# Patient Record
Sex: MALE | Race: WHITE | NOT HISPANIC OR LATINO | Employment: OTHER | URBAN - METROPOLITAN AREA
[De-identification: names, ages, dates, MRNs, and addresses within clinical notes are randomized per-mention and may not be internally consistent; named-entity substitution may affect disease eponyms.]

---

## 2023-03-28 LAB
ALANINE AMINOTRANSFERASE (SGPT) (U/L) IN SER/PLAS: 18 U/L (ref 10–52)
ALBUMIN (G/DL) IN SER/PLAS: 4.5 G/DL (ref 3.4–5)
ALKALINE PHOSPHATASE (U/L) IN SER/PLAS: 56 U/L (ref 33–136)
ANION GAP IN SER/PLAS: 14 MMOL/L (ref 10–20)
ASPARTATE AMINOTRANSFERASE (SGOT) (U/L) IN SER/PLAS: 16 U/L (ref 9–39)
BILIRUBIN TOTAL (MG/DL) IN SER/PLAS: 0.6 MG/DL (ref 0–1.2)
CALCIUM (MG/DL) IN SER/PLAS: 9.3 MG/DL (ref 8.6–10.3)
CARBON DIOXIDE, TOTAL (MMOL/L) IN SER/PLAS: 23 MMOL/L (ref 21–32)
CHLORIDE (MMOL/L) IN SER/PLAS: 105 MMOL/L (ref 98–107)
CHOLESTEROL (MG/DL) IN SER/PLAS: 272 MG/DL (ref 0–199)
CHOLESTEROL IN HDL (MG/DL) IN SER/PLAS: 53.8 MG/DL
CHOLESTEROL/HDL RATIO: 5.1
CREATININE (MG/DL) IN SER/PLAS: 1.24 MG/DL (ref 0.5–1.3)
ERYTHROCYTE DISTRIBUTION WIDTH (RATIO) BY AUTOMATED COUNT: 13.7 % (ref 11.5–14.5)
ERYTHROCYTE MEAN CORPUSCULAR HEMOGLOBIN CONCENTRATION (G/DL) BY AUTOMATED: 33.2 G/DL (ref 32–36)
ERYTHROCYTE MEAN CORPUSCULAR VOLUME (FL) BY AUTOMATED COUNT: 93 FL (ref 80–100)
ERYTHROCYTES (10*6/UL) IN BLOOD BY AUTOMATED COUNT: 4.91 X10E12/L (ref 4.5–5.9)
ESTIMATED AVERAGE GLUCOSE FOR HBA1C: 160 MG/DL
GFR MALE: 59 ML/MIN/1.73M2
GLUCOSE (MG/DL) IN SER/PLAS: 154 MG/DL (ref 74–99)
HEMATOCRIT (%) IN BLOOD BY AUTOMATED COUNT: 45.5 % (ref 41–52)
HEMOGLOBIN (G/DL) IN BLOOD: 15.1 G/DL (ref 13.5–17.5)
HEMOGLOBIN A1C/HEMOGLOBIN TOTAL IN BLOOD: 7.2 %
LDL: 191 MG/DL (ref 0–99)
LEUKOCYTES (10*3/UL) IN BLOOD BY AUTOMATED COUNT: 5.9 X10E9/L (ref 4.4–11.3)
NRBC (PER 100 WBCS) BY AUTOMATED COUNT: 0 /100 WBC (ref 0–0)
PLATELETS (10*3/UL) IN BLOOD AUTOMATED COUNT: 212 X10E9/L (ref 150–450)
POTASSIUM (MMOL/L) IN SER/PLAS: 4.7 MMOL/L (ref 3.5–5.3)
PROTEIN TOTAL: 7 G/DL (ref 6.4–8.2)
SODIUM (MMOL/L) IN SER/PLAS: 137 MMOL/L (ref 136–145)
THYROTROPIN (MIU/L) IN SER/PLAS BY DETECTION LIMIT <= 0.05 MIU/L: 3.06 MIU/L (ref 0.44–3.98)
TRIGLYCERIDE (MG/DL) IN SER/PLAS: 138 MG/DL (ref 0–149)
UREA NITROGEN (MG/DL) IN SER/PLAS: 26 MG/DL (ref 6–23)
VLDL: 28 MG/DL (ref 0–40)

## 2023-12-07 ENCOUNTER — LAB (OUTPATIENT)
Dept: LAB | Facility: LAB | Age: 78
End: 2023-12-07
Payer: MEDICARE

## 2023-12-07 DIAGNOSIS — R53.83 OTHER FATIGUE: Primary | ICD-10-CM

## 2023-12-07 DIAGNOSIS — N40.1 BENIGN PROSTATIC HYPERPLASIA WITH LOWER URINARY TRACT SYMPTOMS: ICD-10-CM

## 2023-12-07 DIAGNOSIS — I10 ESSENTIAL (PRIMARY) HYPERTENSION: ICD-10-CM

## 2023-12-07 DIAGNOSIS — E11.65 TYPE 2 DIABETES MELLITUS WITH HYPERGLYCEMIA (MULTI): ICD-10-CM

## 2023-12-07 DIAGNOSIS — E78.2 MIXED HYPERLIPIDEMIA: ICD-10-CM

## 2023-12-07 LAB
ALBUMIN SERPL BCP-MCNC: 4.4 G/DL (ref 3.4–5)
ALP SERPL-CCNC: 62 U/L (ref 33–136)
ALT SERPL W P-5'-P-CCNC: 17 U/L (ref 10–52)
ANION GAP SERPL CALC-SCNC: 9 MMOL/L (ref 10–20)
AST SERPL W P-5'-P-CCNC: 16 U/L (ref 9–39)
BASOPHILS # BLD AUTO: 0.06 X10*3/UL (ref 0–0.1)
BASOPHILS NFR BLD AUTO: 1.1 %
BILIRUB SERPL-MCNC: 0.5 MG/DL (ref 0–1.2)
BUN SERPL-MCNC: 20 MG/DL (ref 6–23)
CALCIUM SERPL-MCNC: 9.2 MG/DL (ref 8.6–10.3)
CHLORIDE SERPL-SCNC: 106 MMOL/L (ref 98–107)
CHOLEST SERPL-MCNC: 292 MG/DL (ref 0–199)
CHOLESTEROL/HDL RATIO: 6.5
CO2 SERPL-SCNC: 29 MMOL/L (ref 21–32)
CREAT SERPL-MCNC: 1.14 MG/DL (ref 0.5–1.3)
CREAT UR-MCNC: 73.2 MG/DL (ref 20–370)
EOSINOPHIL # BLD AUTO: 0.19 X10*3/UL (ref 0–0.4)
EOSINOPHIL NFR BLD AUTO: 3.5 %
ERYTHROCYTE [DISTWIDTH] IN BLOOD BY AUTOMATED COUNT: 13.9 % (ref 11.5–14.5)
EST. AVERAGE GLUCOSE BLD GHB EST-MCNC: 160 MG/DL
GFR SERPL CREATININE-BSD FRML MDRD: 65 ML/MIN/1.73M*2
GLUCOSE SERPL-MCNC: 150 MG/DL (ref 74–99)
HBA1C MFR BLD: 7.2 %
HCT VFR BLD AUTO: 43.7 % (ref 41–52)
HDLC SERPL-MCNC: 45.1 MG/DL
HGB BLD-MCNC: 14.3 G/DL (ref 13.5–17.5)
HOLD SPECIMEN: NORMAL
IMM GRANULOCYTES # BLD AUTO: 0.01 X10*3/UL (ref 0–0.5)
IMM GRANULOCYTES NFR BLD AUTO: 0.2 % (ref 0–0.9)
LDLC SERPL CALC-MCNC: 210 MG/DL
LYMPHOCYTES # BLD AUTO: 2.07 X10*3/UL (ref 0.8–3)
LYMPHOCYTES NFR BLD AUTO: 37.8 %
MCH RBC QN AUTO: 30.6 PG (ref 26–34)
MCHC RBC AUTO-ENTMCNC: 32.7 G/DL (ref 32–36)
MCV RBC AUTO: 94 FL (ref 80–100)
MICROALBUMIN UR-MCNC: 51.7 MG/L
MICROALBUMIN/CREAT UR: 70.6 UG/MG CREAT
MONOCYTES # BLD AUTO: 0.53 X10*3/UL (ref 0.05–0.8)
MONOCYTES NFR BLD AUTO: 9.7 %
NEUTROPHILS # BLD AUTO: 2.61 X10*3/UL (ref 1.6–5.5)
NEUTROPHILS NFR BLD AUTO: 47.7 %
NON HDL CHOLESTEROL: 247 MG/DL (ref 0–149)
NRBC BLD-RTO: 0 /100 WBCS (ref 0–0)
PLATELET # BLD AUTO: 213 X10*3/UL (ref 150–450)
POTASSIUM SERPL-SCNC: 4.9 MMOL/L (ref 3.5–5.3)
PROT SERPL-MCNC: 7 G/DL (ref 6.4–8.2)
PSA SERPL-MCNC: 1.44 NG/ML
RBC # BLD AUTO: 4.67 X10*6/UL (ref 4.5–5.9)
RBC #/AREA URNS AUTO: NORMAL /HPF
SODIUM SERPL-SCNC: 139 MMOL/L (ref 136–145)
TRIGL SERPL-MCNC: 184 MG/DL (ref 0–149)
TSH SERPL-ACNC: 3.34 MIU/L (ref 0.44–3.98)
VLDL: 37 MG/DL (ref 0–40)
WBC # BLD AUTO: 5.5 X10*3/UL (ref 4.4–11.3)
WBC #/AREA URNS AUTO: NORMAL /HPF

## 2023-12-07 PROCEDURE — 84153 ASSAY OF PSA TOTAL: CPT

## 2023-12-07 PROCEDURE — 81001 URINALYSIS AUTO W/SCOPE: CPT

## 2023-12-07 PROCEDURE — 80053 COMPREHEN METABOLIC PANEL: CPT

## 2023-12-07 PROCEDURE — 84443 ASSAY THYROID STIM HORMONE: CPT

## 2023-12-07 PROCEDURE — 36415 COLL VENOUS BLD VENIPUNCTURE: CPT

## 2023-12-07 PROCEDURE — 82043 UR ALBUMIN QUANTITATIVE: CPT

## 2023-12-07 PROCEDURE — 83036 HEMOGLOBIN GLYCOSYLATED A1C: CPT

## 2023-12-07 PROCEDURE — 80061 LIPID PANEL: CPT

## 2023-12-07 PROCEDURE — 85025 COMPLETE CBC W/AUTO DIFF WBC: CPT

## 2023-12-07 PROCEDURE — 82570 ASSAY OF URINE CREATININE: CPT

## 2024-05-01 ENCOUNTER — APPOINTMENT (OUTPATIENT)
Dept: RADIOLOGY | Facility: HOSPITAL | Age: 79
DRG: 322 | End: 2024-05-01
Payer: MEDICARE

## 2024-05-01 ENCOUNTER — HOSPITAL ENCOUNTER (INPATIENT)
Facility: HOSPITAL | Age: 79
LOS: 2 days | Discharge: HOME | DRG: 322 | End: 2024-05-03
Attending: INTERNAL MEDICINE | Admitting: STUDENT IN AN ORGANIZED HEALTH CARE EDUCATION/TRAINING PROGRAM
Payer: MEDICARE

## 2024-05-01 ENCOUNTER — APPOINTMENT (OUTPATIENT)
Dept: CARDIOLOGY | Facility: HOSPITAL | Age: 79
DRG: 322 | End: 2024-05-01
Payer: MEDICARE

## 2024-05-01 DIAGNOSIS — I50.22 CHRONIC SYSTOLIC HEART FAILURE (MULTI): ICD-10-CM

## 2024-05-01 DIAGNOSIS — E11.69 TYPE 2 DIABETES MELLITUS WITH OTHER SPECIFIED COMPLICATION, WITHOUT LONG-TERM CURRENT USE OF INSULIN (MULTI): ICD-10-CM

## 2024-05-01 DIAGNOSIS — I21.3 STEMI (ST ELEVATION MYOCARDIAL INFARCTION) (MULTI): Primary | ICD-10-CM

## 2024-05-01 DIAGNOSIS — E78.5 DYSLIPIDEMIA: Chronic | ICD-10-CM

## 2024-05-01 DIAGNOSIS — I21.29 ST ELEVATION (STEMI) MYOCARDIAL INFARCTION INVOLVING OTHER SITES (MULTI): ICD-10-CM

## 2024-05-01 DIAGNOSIS — I10 HYPERTENSION, UNSPECIFIED TYPE: ICD-10-CM

## 2024-05-01 DIAGNOSIS — I25.10 CORONARY ARTERY DISEASE INVOLVING NATIVE HEART, UNSPECIFIED VESSEL OR LESION TYPE, UNSPECIFIED WHETHER ANGINA PRESENT: ICD-10-CM

## 2024-05-01 PROBLEM — I63.9 CVA (CEREBRAL VASCULAR ACCIDENT) (MULTI): Status: ACTIVE | Noted: 2024-05-01

## 2024-05-01 PROBLEM — I63.9 CVA (CEREBRAL VASCULAR ACCIDENT) (MULTI): Chronic | Status: ACTIVE | Noted: 2024-05-01

## 2024-05-01 LAB
ALBUMIN SERPL BCP-MCNC: 4.5 G/DL (ref 3.4–5)
ALP SERPL-CCNC: 67 U/L (ref 33–136)
ALT SERPL W P-5'-P-CCNC: 19 U/L (ref 10–52)
ANION GAP SERPL CALC-SCNC: 15 MMOL/L (ref 10–20)
AST SERPL W P-5'-P-CCNC: 17 U/L (ref 9–39)
BASOPHILS # BLD AUTO: 0.06 X10*3/UL (ref 0–0.1)
BASOPHILS NFR BLD AUTO: 0.7 %
BILIRUB SERPL-MCNC: 0.4 MG/DL (ref 0–1.2)
BUN SERPL-MCNC: 24 MG/DL (ref 6–23)
CALCIUM SERPL-MCNC: 9.6 MG/DL (ref 8.6–10.3)
CARDIAC TROPONIN I PNL SERPL HS: 100 NG/L (ref 0–20)
CHLORIDE SERPL-SCNC: 100 MMOL/L (ref 98–107)
CO2 SERPL-SCNC: 23 MMOL/L (ref 21–32)
CREAT SERPL-MCNC: 1.17 MG/DL (ref 0.5–1.3)
EGFRCR SERPLBLD CKD-EPI 2021: 63 ML/MIN/1.73M*2
EOSINOPHIL # BLD AUTO: 0.34 X10*3/UL (ref 0–0.4)
EOSINOPHIL NFR BLD AUTO: 4.1 %
ERYTHROCYTE [DISTWIDTH] IN BLOOD BY AUTOMATED COUNT: 13.8 % (ref 11.5–14.5)
GLUCOSE SERPL-MCNC: 229 MG/DL (ref 74–99)
HCT VFR BLD AUTO: 44.9 % (ref 41–52)
HGB BLD-MCNC: 15.1 G/DL (ref 13.5–17.5)
IMM GRANULOCYTES # BLD AUTO: 0.01 X10*3/UL (ref 0–0.5)
IMM GRANULOCYTES NFR BLD AUTO: 0.1 % (ref 0–0.9)
INR PPP: 0.8 (ref 0.9–1.1)
LYMPHOCYTES # BLD AUTO: 3.77 X10*3/UL (ref 0.8–3)
LYMPHOCYTES NFR BLD AUTO: 45.6 %
MCH RBC QN AUTO: 30.5 PG (ref 26–34)
MCHC RBC AUTO-ENTMCNC: 33.6 G/DL (ref 32–36)
MCV RBC AUTO: 91 FL (ref 80–100)
MONOCYTES # BLD AUTO: 0.78 X10*3/UL (ref 0.05–0.8)
MONOCYTES NFR BLD AUTO: 9.4 %
NEUTROPHILS # BLD AUTO: 3.31 X10*3/UL (ref 1.6–5.5)
NEUTROPHILS NFR BLD AUTO: 40.1 %
NRBC BLD-RTO: 0 /100 WBCS (ref 0–0)
PLATELET # BLD AUTO: 252 X10*3/UL (ref 150–450)
POTASSIUM SERPL-SCNC: 3.9 MMOL/L (ref 3.5–5.3)
PROT SERPL-MCNC: 7.5 G/DL (ref 6.4–8.2)
PROTHROMBIN TIME: 9.3 SECONDS (ref 9.8–12.8)
RBC # BLD AUTO: 4.95 X10*6/UL (ref 4.5–5.9)
SODIUM SERPL-SCNC: 134 MMOL/L (ref 136–145)
WBC # BLD AUTO: 8.3 X10*3/UL (ref 4.4–11.3)

## 2024-05-01 PROCEDURE — 93458 L HRT ARTERY/VENTRICLE ANGIO: CPT | Performed by: INTERNAL MEDICINE

## 2024-05-01 PROCEDURE — 84075 ASSAY ALKALINE PHOSPHATASE: CPT | Performed by: INTERNAL MEDICINE

## 2024-05-01 PROCEDURE — 2060000001 HC INTERMEDIATE ICU ROOM DAILY

## 2024-05-01 PROCEDURE — 85610 PROTHROMBIN TIME: CPT | Performed by: INTERNAL MEDICINE

## 2024-05-01 PROCEDURE — 96375 TX/PRO/DX INJ NEW DRUG ADDON: CPT

## 2024-05-01 PROCEDURE — 71275 CT ANGIOGRAPHY CHEST: CPT

## 2024-05-01 PROCEDURE — 93010 ELECTROCARDIOGRAM REPORT: CPT | Performed by: EMERGENCY MEDICINE

## 2024-05-01 PROCEDURE — 96374 THER/PROPH/DIAG INJ IV PUSH: CPT

## 2024-05-01 PROCEDURE — 99153 MOD SED SAME PHYS/QHP EA: CPT | Performed by: INTERNAL MEDICINE

## 2024-05-01 PROCEDURE — 2720000007 HC OR 272 NO HCPCS: Performed by: INTERNAL MEDICINE

## 2024-05-01 PROCEDURE — 99285 EMERGENCY DEPT VISIT HI MDM: CPT | Mod: 25

## 2024-05-01 PROCEDURE — C1887 CATHETER, GUIDING: HCPCS | Performed by: INTERNAL MEDICINE

## 2024-05-01 PROCEDURE — C1760 CLOSURE DEV, VASC: HCPCS | Performed by: INTERNAL MEDICINE

## 2024-05-01 PROCEDURE — 96373 THER/PROPH/DIAG INJ IA: CPT | Performed by: INTERNAL MEDICINE

## 2024-05-01 PROCEDURE — C1725 CATH, TRANSLUMIN NON-LASER: HCPCS | Performed by: INTERNAL MEDICINE

## 2024-05-01 PROCEDURE — 74174 CTA ABD&PLVS W/CONTRAST: CPT | Performed by: RADIOLOGY

## 2024-05-01 PROCEDURE — C1769 GUIDE WIRE: HCPCS | Performed by: INTERNAL MEDICINE

## 2024-05-01 PROCEDURE — 96365 THER/PROPH/DIAG IV INF INIT: CPT

## 2024-05-01 PROCEDURE — 92941 PRQ TRLML REVSC TOT OCCL AMI: CPT | Performed by: INTERNAL MEDICINE

## 2024-05-01 PROCEDURE — C1874 STENT, COATED/COV W/DEL SYS: HCPCS | Performed by: INTERNAL MEDICINE

## 2024-05-01 PROCEDURE — 2780000003 HC OR 278 NO HCPCS: Performed by: INTERNAL MEDICINE

## 2024-05-01 PROCEDURE — 36415 COLL VENOUS BLD VENIPUNCTURE: CPT | Performed by: INTERNAL MEDICINE

## 2024-05-01 PROCEDURE — 85025 COMPLETE CBC W/AUTO DIFF WBC: CPT | Performed by: INTERNAL MEDICINE

## 2024-05-01 PROCEDURE — 2550000001 HC RX 255 CONTRASTS: Performed by: EMERGENCY MEDICINE

## 2024-05-01 PROCEDURE — 83036 HEMOGLOBIN GLYCOSYLATED A1C: CPT | Mod: STJLAB

## 2024-05-01 PROCEDURE — C9606 PERC D-E COR REVASC W AMI S: HCPCS | Performed by: INTERNAL MEDICINE

## 2024-05-01 PROCEDURE — B2111ZZ FLUOROSCOPY OF MULTIPLE CORONARY ARTERIES USING LOW OSMOLAR CONTRAST: ICD-10-PCS | Performed by: INTERNAL MEDICINE

## 2024-05-01 PROCEDURE — 93005 ELECTROCARDIOGRAM TRACING: CPT

## 2024-05-01 PROCEDURE — 2500000004 HC RX 250 GENERAL PHARMACY W/ HCPCS (ALT 636 FOR OP/ED): Performed by: INTERNAL MEDICINE

## 2024-05-01 PROCEDURE — 2550000001 HC RX 255 CONTRASTS: Performed by: INTERNAL MEDICINE

## 2024-05-01 PROCEDURE — 99291 CRITICAL CARE FIRST HOUR: CPT | Performed by: EMERGENCY MEDICINE

## 2024-05-01 PROCEDURE — 84484 ASSAY OF TROPONIN QUANT: CPT | Performed by: INTERNAL MEDICINE

## 2024-05-01 PROCEDURE — 4A023N7 MEASUREMENT OF CARDIAC SAMPLING AND PRESSURE, LEFT HEART, PERCUTANEOUS APPROACH: ICD-10-PCS | Performed by: INTERNAL MEDICINE

## 2024-05-01 PROCEDURE — 99152 MOD SED SAME PHYS/QHP 5/>YRS: CPT | Performed by: INTERNAL MEDICINE

## 2024-05-01 PROCEDURE — C1894 INTRO/SHEATH, NON-LASER: HCPCS | Performed by: INTERNAL MEDICINE

## 2024-05-01 PROCEDURE — 71275 CT ANGIOGRAPHY CHEST: CPT | Performed by: RADIOLOGY

## 2024-05-01 PROCEDURE — 027035Z DILATION OF CORONARY ARTERY, ONE ARTERY WITH TWO DRUG-ELUTING INTRALUMINAL DEVICES, PERCUTANEOUS APPROACH: ICD-10-PCS | Performed by: INTERNAL MEDICINE

## 2024-05-01 DEVICE — STENT ONYXNG25012UX ONYX 2.50X12RX
Type: IMPLANTABLE DEVICE | Site: CORONARY | Status: FUNCTIONAL
Brand: ONYX FRONTIER™

## 2024-05-01 DEVICE — STENT ONYXNG25026UX ONYX 2.50X26RX
Type: IMPLANTABLE DEVICE | Site: CORONARY | Status: FUNCTIONAL
Brand: ONYX FRONTIER™

## 2024-05-01 RX ORDER — FENTANYL CITRATE 50 UG/ML
INJECTION, SOLUTION INTRAMUSCULAR; INTRAVENOUS AS NEEDED
Status: DISCONTINUED | OUTPATIENT
Start: 2024-05-01 | End: 2024-05-01 | Stop reason: HOSPADM

## 2024-05-01 RX ORDER — EPTIFIBATIDE 2 MG/ML
INJECTION, SOLUTION INTRAVENOUS CONTINUOUS PRN
Status: COMPLETED | OUTPATIENT
Start: 2024-05-01 | End: 2024-05-01

## 2024-05-01 RX ORDER — ATORVASTATIN CALCIUM 80 MG/1
80 TABLET, FILM COATED ORAL NIGHTLY
Status: DISCONTINUED | OUTPATIENT
Start: 2024-05-01 | End: 2024-05-03 | Stop reason: HOSPADM

## 2024-05-01 RX ORDER — HEPARIN SODIUM 1000 [USP'U]/ML
INJECTION, SOLUTION INTRAVENOUS; SUBCUTANEOUS AS NEEDED
Status: DISCONTINUED | OUTPATIENT
Start: 2024-05-01 | End: 2024-05-01 | Stop reason: HOSPADM

## 2024-05-01 RX ORDER — LOSARTAN POTASSIUM 25 MG/1
25 TABLET ORAL DAILY
Status: DISCONTINUED | OUTPATIENT
Start: 2024-05-02 | End: 2024-05-02

## 2024-05-01 RX ORDER — NITROGLYCERIN 40 MG/100ML
INJECTION INTRAVENOUS AS NEEDED
Status: DISCONTINUED | OUTPATIENT
Start: 2024-05-01 | End: 2024-05-01 | Stop reason: HOSPADM

## 2024-05-01 RX ORDER — SODIUM CHLORIDE 9 MG/ML
75 INJECTION, SOLUTION INTRAVENOUS CONTINUOUS
Status: ACTIVE | OUTPATIENT
Start: 2024-05-01 | End: 2024-05-02

## 2024-05-01 RX ORDER — MIDAZOLAM HYDROCHLORIDE 1 MG/ML
INJECTION, SOLUTION INTRAMUSCULAR; INTRAVENOUS AS NEEDED
Status: DISCONTINUED | OUTPATIENT
Start: 2024-05-01 | End: 2024-05-01 | Stop reason: HOSPADM

## 2024-05-01 RX ORDER — LABETALOL HYDROCHLORIDE 5 MG/ML
20 INJECTION, SOLUTION INTRAVENOUS ONCE
Status: DISCONTINUED | OUTPATIENT
Start: 2024-05-01 | End: 2024-05-03 | Stop reason: HOSPADM

## 2024-05-01 RX ORDER — EPTIFIBATIDE 0.75 MG/ML
2 INJECTION, SOLUTION INTRAVENOUS CONTINUOUS
Status: DISPENSED | OUTPATIENT
Start: 2024-05-01 | End: 2024-05-02

## 2024-05-01 RX ORDER — EPTIFIBATIDE 0.75 MG/ML
INJECTION, SOLUTION INTRAVENOUS CONTINUOUS PRN
Status: COMPLETED | OUTPATIENT
Start: 2024-05-01 | End: 2024-05-01

## 2024-05-01 RX ORDER — ASPIRIN 81 MG/1
81 TABLET ORAL DAILY
Status: DISCONTINUED | OUTPATIENT
Start: 2024-05-02 | End: 2024-05-03 | Stop reason: HOSPADM

## 2024-05-01 RX ORDER — METOPROLOL TARTRATE 25 MG/1
25 TABLET, FILM COATED ORAL 2 TIMES DAILY
Status: DISCONTINUED | OUTPATIENT
Start: 2024-05-01 | End: 2024-05-02

## 2024-05-01 RX ADMIN — IOHEXOL 90 ML: 350 INJECTION, SOLUTION INTRAVENOUS at 21:54

## 2024-05-01 ASSESSMENT — PAIN - FUNCTIONAL ASSESSMENT
PAIN_FUNCTIONAL_ASSESSMENT: 0-10

## 2024-05-01 ASSESSMENT — PAIN SCALES - GENERAL
PAINLEVEL_OUTOF10: 7
PAINLEVEL_OUTOF10: 5 - MODERATE PAIN
PAINLEVEL_OUTOF10: 5 - MODERATE PAIN
PAINLEVEL_OUTOF10: 0 - NO PAIN

## 2024-05-01 ASSESSMENT — PAIN DESCRIPTION - DESCRIPTORS
DESCRIPTORS: HEAVINESS
DESCRIPTORS: HEAVINESS

## 2024-05-01 NOTE — Clinical Note
Angioplasty of the left anterior descending lesion. Inflation 1: Pressure = 10 magaly; Duration = 10 sec.

## 2024-05-01 NOTE — Clinical Note
Vessel: LAD (distal). Stent inserted. Inflation 1: Pressure = 12 magaly; Duration = 10 sec. Inflation 2: Pressure = 12 magaly; Duration = 10 sec.

## 2024-05-01 NOTE — Clinical Note
Angioplasty of the distal left anterior descending lesion. Inflation 1: Pressure = 10 magaly; Duration = 10 sec. Inflation 2: Pressure = 10 magaly; Duration = 10 sec. Inflation 3: Pressure = 10 magaly; Duration = 10 sec.

## 2024-05-02 ENCOUNTER — APPOINTMENT (OUTPATIENT)
Dept: CARDIOLOGY | Facility: HOSPITAL | Age: 79
DRG: 322 | End: 2024-05-02
Payer: MEDICARE

## 2024-05-02 LAB
ALBUMIN SERPL BCP-MCNC: 3.7 G/DL (ref 3.4–5)
ANION GAP SERPL CALC-SCNC: 14 MMOL/L (ref 10–20)
AORTIC VALVE MEAN GRADIENT: 7 MMHG
AORTIC VALVE PEAK VELOCITY: 1.76 M/S
AV PEAK GRADIENT: 12.4 MMHG
AVA (PEAK VEL): 2.35 CM2
AVA (VTI): 2.2 CM2
BUN SERPL-MCNC: 20 MG/DL (ref 6–23)
CALCIUM SERPL-MCNC: 8.5 MG/DL (ref 8.6–10.3)
CARDIAC TROPONIN I PNL SERPL HS: 3161 NG/L (ref 0–20)
CHLORIDE SERPL-SCNC: 105 MMOL/L (ref 98–107)
CO2 SERPL-SCNC: 19 MMOL/L (ref 21–32)
CREAT SERPL-MCNC: 0.84 MG/DL (ref 0.5–1.3)
EGFRCR SERPLBLD CKD-EPI 2021: 88 ML/MIN/1.73M*2
EJECTION FRACTION APICAL 4 CHAMBER: 38.6
ERYTHROCYTE [DISTWIDTH] IN BLOOD BY AUTOMATED COUNT: 13.7 % (ref 11.5–14.5)
EST. AVERAGE GLUCOSE BLD GHB EST-MCNC: 180 MG/DL
GLUCOSE BLD MANUAL STRIP-MCNC: 159 MG/DL (ref 74–99)
GLUCOSE BLD MANUAL STRIP-MCNC: 168 MG/DL (ref 74–99)
GLUCOSE BLD MANUAL STRIP-MCNC: 211 MG/DL (ref 74–99)
GLUCOSE SERPL-MCNC: 182 MG/DL (ref 74–99)
HBA1C MFR BLD: 7.9 %
HCT VFR BLD AUTO: 40.5 % (ref 41–52)
HGB BLD-MCNC: 13.6 G/DL (ref 13.5–17.5)
LEFT VENTRICLE INTERNAL DIMENSION DIASTOLE: 4.9 CM (ref 3.5–6)
LEFT VENTRICULAR OUTFLOW TRACT DIAMETER: 2.2 CM
LV EJECTION FRACTION BIPLANE: 38 %
MAGNESIUM SERPL-MCNC: 2.15 MG/DL (ref 1.6–2.4)
MCH RBC QN AUTO: 30.4 PG (ref 26–34)
MCHC RBC AUTO-ENTMCNC: 33.6 G/DL (ref 32–36)
MCV RBC AUTO: 91 FL (ref 80–100)
NRBC BLD-RTO: 0 /100 WBCS (ref 0–0)
PHOSPHATE SERPL-MCNC: 2.8 MG/DL (ref 2.5–4.9)
PLATELET # BLD AUTO: 208 X10*3/UL (ref 150–450)
POTASSIUM SERPL-SCNC: 3.9 MMOL/L (ref 3.5–5.3)
RBC # BLD AUTO: 4.47 X10*6/UL (ref 4.5–5.9)
RIGHT VENTRICLE FREE WALL PEAK S': 12.7 CM/S
RIGHT VENTRICLE PEAK SYSTOLIC PRESSURE: 20.8 MMHG
SODIUM SERPL-SCNC: 134 MMOL/L (ref 136–145)
TRICUSPID ANNULAR PLANE SYSTOLIC EXCURSION: 1.7 CM
WBC # BLD AUTO: 8 X10*3/UL (ref 4.4–11.3)

## 2024-05-02 PROCEDURE — 85027 COMPLETE CBC AUTOMATED: CPT

## 2024-05-02 PROCEDURE — 36415 COLL VENOUS BLD VENIPUNCTURE: CPT

## 2024-05-02 PROCEDURE — 2500000004 HC RX 250 GENERAL PHARMACY W/ HCPCS (ALT 636 FOR OP/ED): Performed by: INTERNAL MEDICINE

## 2024-05-02 PROCEDURE — 99223 1ST HOSP IP/OBS HIGH 75: CPT | Performed by: INTERNAL MEDICINE

## 2024-05-02 PROCEDURE — 2500000001 HC RX 250 WO HCPCS SELF ADMINISTERED DRUGS (ALT 637 FOR MEDICARE OP)

## 2024-05-02 PROCEDURE — 2500000001 HC RX 250 WO HCPCS SELF ADMINISTERED DRUGS (ALT 637 FOR MEDICARE OP): Performed by: INTERNAL MEDICINE

## 2024-05-02 PROCEDURE — 2060000001 HC INTERMEDIATE ICU ROOM DAILY

## 2024-05-02 PROCEDURE — 2500000006 HC RX 250 W HCPCS SELF ADMINISTERED DRUGS (ALT 637 FOR ALL PAYERS): Mod: MUE | Performed by: INTERNAL MEDICINE

## 2024-05-02 PROCEDURE — 99223 1ST HOSP IP/OBS HIGH 75: CPT

## 2024-05-02 PROCEDURE — 93010 ELECTROCARDIOGRAM REPORT: CPT | Performed by: INTERNAL MEDICINE

## 2024-05-02 PROCEDURE — 83735 ASSAY OF MAGNESIUM: CPT

## 2024-05-02 PROCEDURE — 93306 TTE W/DOPPLER COMPLETE: CPT

## 2024-05-02 PROCEDURE — 84484 ASSAY OF TROPONIN QUANT: CPT | Performed by: INTERNAL MEDICINE

## 2024-05-02 PROCEDURE — 82947 ASSAY GLUCOSE BLOOD QUANT: CPT

## 2024-05-02 PROCEDURE — 80069 RENAL FUNCTION PANEL: CPT

## 2024-05-02 PROCEDURE — 93005 ELECTROCARDIOGRAM TRACING: CPT

## 2024-05-02 PROCEDURE — 2500000002 HC RX 250 W HCPCS SELF ADMINISTERED DRUGS (ALT 637 FOR MEDICARE OP, ALT 636 FOR OP/ED)

## 2024-05-02 PROCEDURE — 36415 COLL VENOUS BLD VENIPUNCTURE: CPT | Performed by: INTERNAL MEDICINE

## 2024-05-02 PROCEDURE — 93306 TTE W/DOPPLER COMPLETE: CPT | Performed by: INTERNAL MEDICINE

## 2024-05-02 RX ORDER — DEXTROSE 50 % IN WATER (D50W) INTRAVENOUS SYRINGE
25
Status: DISCONTINUED | OUTPATIENT
Start: 2024-05-02 | End: 2024-05-03 | Stop reason: HOSPADM

## 2024-05-02 RX ORDER — LOSARTAN POTASSIUM 50 MG/1
50 TABLET ORAL DAILY
Status: DISCONTINUED | OUTPATIENT
Start: 2024-05-03 | End: 2024-05-02

## 2024-05-02 RX ORDER — ONDANSETRON HYDROCHLORIDE 2 MG/ML
4 INJECTION, SOLUTION INTRAVENOUS EVERY 6 HOURS PRN
Status: DISCONTINUED | OUTPATIENT
Start: 2024-05-02 | End: 2024-05-03 | Stop reason: HOSPADM

## 2024-05-02 RX ORDER — ASCORBIC ACID 500 MG
500 TABLET ORAL DAILY
COMMUNITY

## 2024-05-02 RX ORDER — ONDANSETRON 4 MG/1
4 TABLET, FILM COATED ORAL EVERY 8 HOURS PRN
Status: CANCELLED | OUTPATIENT
Start: 2024-05-02

## 2024-05-02 RX ORDER — GLUCOSAMINE HCL 500 MG
1 TABLET ORAL DAILY
COMMUNITY

## 2024-05-02 RX ORDER — POLYETHYLENE GLYCOL 3350 17 G/17G
17 POWDER, FOR SOLUTION ORAL DAILY
Status: DISCONTINUED | OUTPATIENT
Start: 2024-05-02 | End: 2024-05-03 | Stop reason: HOSPADM

## 2024-05-02 RX ORDER — MULTIVIT-MIN/IRON FUM/FOLIC AC 7.5 MG-4
1 TABLET ORAL DAILY
COMMUNITY

## 2024-05-02 RX ORDER — ACETYLCYSTEINE 600 MG
1 CAPSULE ORAL DAILY
COMMUNITY
End: 2024-05-03 | Stop reason: HOSPADM

## 2024-05-02 RX ORDER — BIOTIN 5 MG
1 TABLET ORAL DAILY
COMMUNITY
End: 2024-05-03 | Stop reason: HOSPADM

## 2024-05-02 RX ORDER — DEXTROSE 50 % IN WATER (D50W) INTRAVENOUS SYRINGE
12.5
Status: DISCONTINUED | OUTPATIENT
Start: 2024-05-02 | End: 2024-05-03 | Stop reason: HOSPADM

## 2024-05-02 RX ORDER — LOSARTAN POTASSIUM 25 MG/1
25 TABLET ORAL ONCE
Status: DISCONTINUED | OUTPATIENT
Start: 2024-05-02 | End: 2024-05-02

## 2024-05-02 RX ORDER — ONDANSETRON HYDROCHLORIDE 2 MG/ML
4 INJECTION, SOLUTION INTRAVENOUS EVERY 8 HOURS PRN
Status: CANCELLED | OUTPATIENT
Start: 2024-05-02

## 2024-05-02 RX ORDER — INSULIN LISPRO 100 [IU]/ML
0-10 INJECTION, SOLUTION INTRAVENOUS; SUBCUTANEOUS
Status: DISCONTINUED | OUTPATIENT
Start: 2024-05-02 | End: 2024-05-03 | Stop reason: HOSPADM

## 2024-05-02 RX ORDER — EMPAGLIFLOZIN AND METFORMIN HYDROCHLORIDE 12.5; 1 MG/1; MG/1
1 TABLET ORAL DAILY
COMMUNITY
End: 2024-05-09 | Stop reason: ALTCHOICE

## 2024-05-02 RX ORDER — CARVEDILOL 12.5 MG/1
12.5 TABLET ORAL
Status: DISCONTINUED | OUTPATIENT
Start: 2024-05-02 | End: 2024-05-03 | Stop reason: HOSPADM

## 2024-05-02 RX ORDER — CALCIUM CARBONATE 200(500)MG
500 TABLET,CHEWABLE ORAL ONCE
Status: DISCONTINUED | OUTPATIENT
Start: 2024-05-02 | End: 2024-05-03 | Stop reason: HOSPADM

## 2024-05-02 RX ORDER — L.ACIDOPH/L.BULG/B.BIF/S.THERM 1B-250 MG
1 TABLET ORAL DAILY
COMMUNITY

## 2024-05-02 RX ADMIN — METOPROLOL TARTRATE 25 MG: 25 TABLET, FILM COATED ORAL at 08:29

## 2024-05-02 RX ADMIN — LOSARTAN POTASSIUM 25 MG: 25 TABLET, FILM COATED ORAL at 08:29

## 2024-05-02 RX ADMIN — ATORVASTATIN CALCIUM 80 MG: 80 TABLET, FILM COATED ORAL at 00:04

## 2024-05-02 RX ADMIN — INSULIN LISPRO 2 UNITS: 100 INJECTION, SOLUTION INTRAVENOUS; SUBCUTANEOUS at 14:14

## 2024-05-02 RX ADMIN — TICAGRELOR 90 MG: 90 TABLET ORAL at 21:43

## 2024-05-02 RX ADMIN — INSULIN LISPRO 2 UNITS: 100 INJECTION, SOLUTION INTRAVENOUS; SUBCUTANEOUS at 18:13

## 2024-05-02 RX ADMIN — SODIUM CHLORIDE 75 ML/HR: 9 INJECTION, SOLUTION INTRAVENOUS at 00:04

## 2024-05-02 RX ADMIN — SACUBITRIL AND VALSARTAN 2 TABLET: 24; 26 TABLET, FILM COATED ORAL at 21:42

## 2024-05-02 RX ADMIN — EPTIFIBATIDE 2 MCG/KG/MIN: 0.75 INJECTION INTRAVENOUS at 00:04

## 2024-05-02 RX ADMIN — ATORVASTATIN CALCIUM 80 MG: 80 TABLET, FILM COATED ORAL at 21:43

## 2024-05-02 RX ADMIN — EPTIFIBATIDE 2 MCG/KG/MIN: 0.75 INJECTION INTRAVENOUS at 01:24

## 2024-05-02 RX ADMIN — CARVEDILOL 12.5 MG: 12.5 TABLET, FILM COATED ORAL at 18:13

## 2024-05-02 RX ADMIN — TICAGRELOR 90 MG: 90 TABLET ORAL at 08:29

## 2024-05-02 RX ADMIN — ASPIRIN 81 MG: 81 TABLET, COATED ORAL at 08:30

## 2024-05-02 RX ADMIN — EPTIFIBATIDE 2 MCG/KG/MIN: 0.75 INJECTION INTRAVENOUS at 08:25

## 2024-05-02 RX ADMIN — EPTIFIBATIDE 2 MCG/KG/MIN: 0.75 INJECTION INTRAVENOUS at 15:21

## 2024-05-02 SDOH — ECONOMIC STABILITY: INCOME INSECURITY: HOW HARD IS IT FOR YOU TO PAY FOR THE VERY BASICS LIKE FOOD, HOUSING, MEDICAL CARE, AND HEATING?: NOT HARD AT ALL

## 2024-05-02 SDOH — SOCIAL STABILITY: SOCIAL INSECURITY: HAS ANYONE EVER THREATENED TO HURT YOUR FAMILY OR YOUR PETS?: NO

## 2024-05-02 SDOH — SOCIAL STABILITY: SOCIAL INSECURITY: ARE YOU OR HAVE YOU BEEN THREATENED OR ABUSED PHYSICALLY, EMOTIONALLY, OR SEXUALLY BY ANYONE?: NO

## 2024-05-02 SDOH — SOCIAL STABILITY: SOCIAL INSECURITY: ARE THERE ANY APPARENT SIGNS OF INJURIES/BEHAVIORS THAT COULD BE RELATED TO ABUSE/NEGLECT?: NO

## 2024-05-02 SDOH — ECONOMIC STABILITY: FOOD INSECURITY: WITHIN THE PAST 12 MONTHS, YOU WORRIED THAT YOUR FOOD WOULD RUN OUT BEFORE YOU GOT MONEY TO BUY MORE.: NEVER TRUE

## 2024-05-02 SDOH — ECONOMIC STABILITY: FOOD INSECURITY: WITHIN THE PAST 12 MONTHS, THE FOOD YOU BOUGHT JUST DIDN'T LAST AND YOU DIDN'T HAVE MONEY TO GET MORE.: NEVER TRUE

## 2024-05-02 SDOH — SOCIAL STABILITY: SOCIAL INSECURITY: DO YOU FEEL ANYONE HAS EXPLOITED OR TAKEN ADVANTAGE OF YOU FINANCIALLY OR OF YOUR PERSONAL PROPERTY?: NO

## 2024-05-02 SDOH — ECONOMIC STABILITY: INCOME INSECURITY: IN THE PAST 12 MONTHS, HAS THE ELECTRIC, GAS, OIL, OR WATER COMPANY THREATENED TO SHUT OFF SERVICE IN YOUR HOME?: NO

## 2024-05-02 SDOH — SOCIAL STABILITY: SOCIAL INSECURITY: WERE YOU ABLE TO COMPLETE ALL THE BEHAVIORAL HEALTH SCREENINGS?: YES

## 2024-05-02 SDOH — SOCIAL STABILITY: SOCIAL INSECURITY: ABUSE: ADULT

## 2024-05-02 SDOH — SOCIAL STABILITY: SOCIAL INSECURITY: HAVE YOU HAD ANY THOUGHTS OF HARMING ANYONE ELSE?: NO

## 2024-05-02 SDOH — SOCIAL STABILITY: SOCIAL INSECURITY: HAVE YOU HAD THOUGHTS OF HARMING ANYONE ELSE?: NO

## 2024-05-02 SDOH — SOCIAL STABILITY: SOCIAL INSECURITY: DOES ANYONE TRY TO KEEP YOU FROM HAVING/CONTACTING OTHER FRIENDS OR DOING THINGS OUTSIDE YOUR HOME?: NO

## 2024-05-02 SDOH — SOCIAL STABILITY: SOCIAL INSECURITY: DO YOU FEEL UNSAFE GOING BACK TO THE PLACE WHERE YOU ARE LIVING?: NO

## 2024-05-02 ASSESSMENT — COGNITIVE AND FUNCTIONAL STATUS - GENERAL
PATIENT BASELINE BEDBOUND: NO
CLIMB 3 TO 5 STEPS WITH RAILING: A LITTLE
WALKING IN HOSPITAL ROOM: A LITTLE
MOBILITY SCORE: 22
DAILY ACTIVITIY SCORE: 24

## 2024-05-02 ASSESSMENT — PAIN SCALES - GENERAL
PAINLEVEL_OUTOF10: 0 - NO PAIN
PAINLEVEL_OUTOF10: 5 - MODERATE PAIN
PAINLEVEL_OUTOF10: 0 - NO PAIN
PAINLEVEL_OUTOF10: 0 - NO PAIN

## 2024-05-02 ASSESSMENT — ACTIVITIES OF DAILY LIVING (ADL)
LACK_OF_TRANSPORTATION: NO
WALKS IN HOME: INDEPENDENT
GROOMING: INDEPENDENT
ADEQUATE_TO_COMPLETE_ADL: YES
HEARING - LEFT EAR: FUNCTIONAL
TOILETING: INDEPENDENT
PATIENT'S MEMORY ADEQUATE TO SAFELY COMPLETE DAILY ACTIVITIES?: YES
DRESSING YOURSELF: INDEPENDENT
FEEDING YOURSELF: INDEPENDENT
HEARING - RIGHT EAR: FUNCTIONAL
JUDGMENT_ADEQUATE_SAFELY_COMPLETE_DAILY_ACTIVITIES: YES
BATHING: INDEPENDENT

## 2024-05-02 ASSESSMENT — PAIN - FUNCTIONAL ASSESSMENT
PAIN_FUNCTIONAL_ASSESSMENT: 0-10

## 2024-05-02 ASSESSMENT — PAIN DESCRIPTION - DESCRIPTORS: DESCRIPTORS: SORE

## 2024-05-02 ASSESSMENT — COLUMBIA-SUICIDE SEVERITY RATING SCALE - C-SSRS
2. HAVE YOU ACTUALLY HAD ANY THOUGHTS OF KILLING YOURSELF?: NO
6. HAVE YOU EVER DONE ANYTHING, STARTED TO DO ANYTHING, OR PREPARED TO DO ANYTHING TO END YOUR LIFE?: NO
1. IN THE PAST MONTH, HAVE YOU WISHED YOU WERE DEAD OR WISHED YOU COULD GO TO SLEEP AND NOT WAKE UP?: NO

## 2024-05-02 ASSESSMENT — LIFESTYLE VARIABLES
AUDIT-C TOTAL SCORE: 0
HOW OFTEN DO YOU HAVE 6 OR MORE DRINKS ON ONE OCCASION: NEVER
SKIP TO QUESTIONS 9-10: 1
HOW MANY STANDARD DRINKS CONTAINING ALCOHOL DO YOU HAVE ON A TYPICAL DAY: PATIENT DOES NOT DRINK
AUDIT-C TOTAL SCORE: 0
HOW OFTEN DO YOU HAVE A DRINK CONTAINING ALCOHOL: NEVER

## 2024-05-02 ASSESSMENT — PATIENT HEALTH QUESTIONNAIRE - PHQ9
1. LITTLE INTEREST OR PLEASURE IN DOING THINGS: NOT AT ALL
SUM OF ALL RESPONSES TO PHQ9 QUESTIONS 1 & 2: 0
2. FEELING DOWN, DEPRESSED OR HOPELESS: NOT AT ALL

## 2024-05-02 NOTE — CARE PLAN
Pt admitted to room 2105 following cardiac cath. No complaints of chest pain, does endorse mild heart burn. Pt complained of slight SOB, placed on 2L NC. R radial TR band removed per orders, see associated flow sheet. Site clean/intact. Integrilin gtt running. Safety maintained, will continue to monitor     Problem: Pain - Adult  Goal: Verbalizes/displays adequate comfort level or baseline comfort level  Outcome: Progressing     Problem: Safety - Adult  Goal: Free from fall injury  Outcome: Progressing     Problem: Discharge Planning  Goal: Discharge to home or other facility with appropriate resources  Outcome: Progressing     Problem: Chronic Conditions and Co-morbidities  Goal: Patient's chronic conditions and co-morbidity symptoms are monitored and maintained or improved  Outcome: Progressing     Problem: ACS/CP/NSTEMI/STEMI  Goal: Chest pain managed (free from pain or at acceptable level)  Outcome: Progressing  Goal: Lab values return to normal range  Outcome: Progressing  Goal: Promote self management  Outcome: Progressing  Goal: Serial ECG will return to baseline  Outcome: Progressing  Goal: Verbalize understanding of procedures/devices  Outcome: Progressing     Problem: Cardiac catheterization  Goal: Free from dysrhythmias  Outcome: Progressing  Goal: Free from pain  Outcome: Progressing  Goal: No evidence of post procedure complications  Outcome: Progressing  Goal: Promote self management  Outcome: Progressing  Goal: Verbalize understanding of procedure  Outcome: Progressing  Goal: Care and maintenance of device (specify)  Outcome: Progressing

## 2024-05-02 NOTE — ED PROVIDER NOTES
EMERGENCY DEPARTMENT ENCOUNTER      Pt Name: Phi Hoffman  MRN: [id number]  Birthdate 1945  Date of evaluation: 5/1/2024  Provider: Mich Post DO    CHIEF COMPLAINT     No chief complaint on file.    HISTORY OF PRESENT ILLNESS    Phi Hoffman is a 79 y.o. year old male who presents to the ER for chest pain.  Started 5 days prior to arrival.  Last Po 2030  Chest pain feels like elephant sitting on him + SoB, - fever, - fainting, No abdominal pain  Earlier today had headache and LUE hemiballismus    PMH CAD s/p 8 stents     PAST MEDICAL HISTORY   No past medical history on file.  CURRENT MEDICATIONS       There are no discharge medications for this patient.    SURGICAL HISTORY       Past Surgical History:   Procedure Laterality Date    CARDIAC CATHETERIZATION N/A 5/1/2024    Procedure: Left Heart Cath, No LV;  Surgeon: Maurice Salmon MD;  Location: [facility] Cardiac Cath Lab;  Service: Cardiovascular;  Laterality: N/A;    CARDIAC CATHETERIZATION N/A 5/1/2024    Procedure: PCI;  Surgeon: Maurice Salmon MD;  Location: [facility] Cardiac Cath Lab;  Service: Cardiovascular;  Laterality: N/A;     ALLERGIES     Patient has no known allergies.  FAMILY HISTORY     No family history on file.  SOCIAL HISTORY       Social History     Tobacco Use    Smoking status: Not on file    Smokeless tobacco: Not on file   Substance Use Topics    Alcohol use: Not on file    Drug use: Not on file     PHYSICAL EXAM  (up to 7 for level 4, 8 or more for level 5)     ED Triage Vitals   Temp Pulse Resp BP   -- -- -- --      SpO2 Temp src Heart Rate Source Patient Position   -- -- -- --      BP Location FiO2 (%)     -- --       Physical Exam  Vitals and nursing note reviewed.   Constitutional:       General: He is in acute distress.      Appearance: Normal appearance. He is ill-appearing and diaphoretic.   HENT:      Head: Normocephalic and atraumatic. No raccoon eyes, Nye's sign, contusion or laceration.      Jaw: No trismus or  malocclusion.      Right Ear: External ear normal.      Left Ear: External ear normal.      Mouth/Throat:      Mouth: Mucous membranes are moist.      Pharynx: Oropharynx is clear.   Eyes:      Extraocular Movements: Extraocular movements intact.      Pupils: Pupils are equal, round, and reactive to light.   Neck:      Trachea: No tracheal deviation.   Cardiovascular:      Rate and Rhythm: Normal rate and regular rhythm.      Pulses: Normal pulses.      Comments: Bilateral radials, DPs and PTs symmetric 2+  Pulmonary:      Effort: No respiratory distress.      Breath sounds: No wheezing, rhonchi or rales.   Chest:      Chest wall: No tenderness.   Abdominal:      General: Abdomen is flat.      Palpations: Abdomen is soft. There is no mass.      Tenderness: There is no abdominal tenderness.   Musculoskeletal:         General: No tenderness or signs of injury.      Cervical back: Normal range of motion. No tenderness.      Right lower leg: No edema.      Left lower leg: No edema.   Skin:     Coloration: Skin is not jaundiced or pale.      Findings: No petechiae, rash or wound.   Neurological:      General: No focal deficit present.      Mental Status: He is alert.   Psychiatric:         Speech: Speech normal.         Thought Content: Thought content does not include homicidal or suicidal ideation.        DIAGNOSTIC RESULTS   LABS:  Labs Reviewed   CBC WITH AUTO DIFFERENTIAL - Abnormal       Result Value    WBC 8.3      nRBC 0.0      RBC 4.95      Hemoglobin 15.1      Hematocrit 44.9      MCV 91      MCH 30.5      MCHC 33.6      RDW 13.8      Platelets 252      Neutrophils % 40.1      Immature Granulocytes %, Automated 0.1      Lymphocytes % 45.6      Monocytes % 9.4      Eosinophils % 4.1      Basophils % 0.7      Neutrophils Absolute 3.31      Immature Granulocytes Absolute, Automated 0.01      Lymphocytes Absolute 3.77 (*)     Monocytes Absolute 0.78      Eosinophils Absolute 0.34      Basophils Absolute 0.06      COMPREHENSIVE METABOLIC PANEL - Abnormal    Glucose 229 (*)     Sodium 134 (*)     Potassium 3.9      Chloride 100      Bicarbonate 23      Anion Gap 15      Urea Nitrogen 24 (*)     Creatinine 1.17      eGFR 63      Calcium 9.6      Albumin 4.5      Alkaline Phosphatase 67      Total Protein 7.5      AST 17      Bilirubin, Total 0.4      ALT 19     PROTIME-INR - Abnormal    Protime 9.3 (*)     INR 0.8 (*)    SERIAL TROPONIN-INITIAL - Abnormal    Troponin I, High Sensitivity 100 (*)     Narrative:     Less than 99th percentile of normal range cutoff-  Female and children under 18 years old <14 ng/L; Male <21 ng/L: Negative  Repeat testing should be performed if clinically indicated.     Female and children under 18 years old 14-50 ng/L; Male 21-50 ng/L:  Consistent with possible cardiac damage and possible increased clinical   risk. Serial measurements may help to assess extent of myocardial damage.     >50 ng/L: Consistent with cardiac damage, increased clinical risk and  myocardial infarction. Serial measurements may help assess extent of   myocardial damage.      NOTE: Children less than 1 year old may have higher baseline troponin   levels and results should be interpreted in conjunction with the overall   clinical context.     NOTE: Troponin I testing is performed using a different   testing methodology at [facility] than at other   [facility]. Direct result comparisons should only   be made within the same method.   TROPONIN SERIES- (INITIAL, 1 HR)    Narrative:     The following orders were created for panel order Troponin I Series, High Sensitivity (0, 1 HR).  Procedure                               Abnormality         Status                     ---------                               -----------         ------                     Troponin I, High Sensiti...[[id number]]  Abnormal            Final result               Troponin, High Sensitivi...[[id number]]                      In process                    Please view results for these tests on the individual orders.   SERIAL TROPONIN, 1 HOUR   RENAL FUNCTION PANEL   MAGNESIUM   CBC     All other labs were within normal range or not returned as of this dictation.  Imaging  CT angio chest abdomen pelvis   Final Result   No aortic aneurysm or dissection.        Sliding-type hiatal hernia.        Scattered pulmonary nodules, largest measuring up to 7 mm. Recommend   follow-up CT chest at 6-12 months as per Fleischner criteria. A   pleural-based 8 mm pulmonary nodule abutting the right horizontal   fissure likely represents an intrapulmonary lymph node.        Scattered colonic diverticula. Focal wall thickening of the sigmoid   colon with mild adjacent stranding, which may be seen with   diverticulitis. Correlate with clinical symptoms and consider   nonemergent follow-up with colonoscopy.        Age-indeterminate height loss of the L4 vertebral body. Correlate   with point tenderness.        MACRO:   Critical Finding:  See findings. Notification was initiated on   5/1/2024 at 10:40 pm by  Evan Finkelstein.  (**-YCF-**) Instructions:        Signed by: Evan Finkelstein 5/1/2024 10:40 PM   Dictation workstation:   [id number]      XR chest 1 view    (Results Pending)   Cardiac Catheterization Procedure    (Results Pending)   Transthoracic Echo (TTE) Complete    (Results Pending)      Procedure  Critical Care    Performed by: Reuben Mondragon DO  Authorized by: Reuben Mondragon DO    Critical care provider statement:     Critical care time (minutes):  30    Critical care time was exclusive of:  Separately billable procedures and treating other patients    Critical care was necessary to treat or prevent imminent or life-threatening deterioration of the following conditions:  Cardiac failure    Critical care was time spent personally by me on the following activities:  Development of treatment plan with patient or surrogate, discussions with consultants,  "examination of patient, obtaining history from patient or surrogate, ordering and review of laboratory studies and ordering and review of radiographic studies  Comments:      STEMI with concerns for aortic dissection    EMERGENCY DEPARTMENT COURSE/MDM:   Medical Decision Making    Vitals:    Vitals:    05/01/24 2133 05/01/24 2136 05/01/24 2146 05/01/24 2212   BP: (!) 220/102 (!) 232/110 (!) 220/105    Patient Position: Sitting  Sitting    Pulse: 69 75 65    Resp: 20 19 13    SpO2: 98% 98% 96% 98%   Weight: 95.3 kg (210 lb)      Height: 1.727 m (5' 8\")        Phi Hoffman is a male 79 y.o. who presents to the ER for chest pain. On arrival the patients vital signs were: Afebrile, Reguar HR, Hypertensive, Regular RR, and Oxygenating well on room air. History obtained from: patient and Spouse.  EKG did show inferior wall STEMI, STEMI alert called.  Interventional cardiology, Dr. Salmon notified, Cath Lab activated.  Given transient hemiballismus in setting of chest pain, patient will go to CT for chest attack to rule out dissection prior to Cath Lab.  Patient did receive aspirin prior to arrival from spouse 325mg.  Discussed with interventional cardiology, Dr. Salmon recommended starting heparin and Brilinta if CT shows no dissection.  Labetalol ordered for hypertension.      Notably Dr. Salmon did meet the patient at CT scanner, patient did not return to ED for heparin and Brilinta, was transported straight from CT to Cath Lab.    ED Course as of 05/02/24 0018   Thu May 02, 2024   0015 Protime-INR(!)  No acute coagulopathy [CB]   0017 Troponin I Series, High Sensitivity (0, 1 HR)(!!)  Elevated consistent with ACS [CB]   0017 CBC with Differential(!)  No acute leukocytosis, leukopenia, thrombocytosis, thrombocytopenia, or anemia [CB]   0017 Comprehensive Metabolic Panel(!)  Slightly elevated glucose, mild hyponatremia, no additional acute electrolyte or hepatorenal abnormality [CB]      ED Course User " Index  [CB] Mich Post DO         Diagnoses as of 05/02/24 0018   STEMI (ST elevation myocardial infarction) (Multi)     External Records Reviewed: I reviewed recent and relevant outside records including inpatient notes, outpatient records    Shared decision making for disposition  Patient and/or patient´s representative was counseled regarding labs, imaging, likely diagnosis. All questions were answered. Recommendation was made   for Admission given the need for further escalation of care to inpatient management. Patient agreed and was admitted in stable condition. Admitting team was notified of any pending labs or imaging to ensure continuity of care.     ED Medications administered this visit:    Medications   labetaloL (Normodyne,Trandate) injection 20 mg (has no administration in time range)   sodium chloride 0.9% infusion (75 mL/hr intravenous New Bag 5/2/24 0004)   ticagrelor (Brilinta) tablet 90 mg (180 mg oral Given 5/1/24 2254)   aspirin EC tablet 81 mg (has no administration in time range)   eptifibatide (Integrilin) 0.75 mg/mL infusion (2 mcg/kg/min × 95.3 kg intravenous New Bag 5/2/24 0004)   atorvastatin (Lipitor) tablet 80 mg (80 mg oral Given 5/2/24 0004)   perflutren lipid microspheres (Definity) injection 0.5-10 mL of dilution (has no administration in time range)   metoprolol tartrate (Lopressor) tablet 25 mg (has no administration in time range)   losartan (Cozaar) tablet 25 mg (has no administration in time range)   polyethylene glycol (Glycolax, Miralax) packet 17 g (has no administration in time range)   iohexol (OMNIPaque) 350 mg iodine/mL solution 90 mL (90 mL intravenous Given 5/1/24 2154)   eptifibatide (Integrilin) bolus ( intravenous Restarted 5/1/24 2246)   eptifibatide (Integrilin) 0.75 mg/mL infusion (2 mcg/kg/min × 95.3 kg intravenous New Bag 5/1/24 2237)          Final Impression:   1. STEMI (ST elevation myocardial infarction) (Multi)          Please excuse any misspellings  or unintended errors related to the Dragon speech recognition software used to dictate this note.    I reviewed the case with the attending ED physician. The attending ED physician agrees with the plan.      Mich Post DO  Resident  05/02/24 0018    The patient was seen by the resident/fellow.  I have personally performed a substantive portion of the encounter.  I have seen and examined the patient; agree with the workup, evaluation, MDM, management and diagnosis.  The care plan has been discussed with the resident; I have reviewed the resident’s note and agree with the documented findings.         Reuben Mondragon DO  05/04/24 0708

## 2024-05-02 NOTE — NURSING NOTE
Stable day, BP elevated earlier, improved with morning meds. Integrillin drip to stop at 1800. Ambulated to bathroom, very independent and stable but still a fall risk.

## 2024-05-02 NOTE — H&P
This interview was conducted after cardiac catheterization.    History Of Present Illness  Phi Hoffman is a 79 y.o. male with PMH significant for CAD (s/p multiple PCI's 2002, 2011, 2019 with stents to LCx, LAD, first obtuse marginal branch), CVA (s/p tPA with no residual deficits), factor V Leiden deficiency, HLD, HTN.  Patient presents to [facility] for chief complaint of substernal chest pressure as well as numbness and tingling in the right upper extremity.  Patient stated that he was in his normal state of health this morning and went and worked out at [employer].  At work he started to notice some right upper extremity movement on its own which resolved.  He had his wife come check on him at work, and she continue to check in with him throughout the day making sure he was okay.  Then at approximately 9 PM on 5/1/2024 he stated he was having chest pressure like an elephant was sitting on his chest.  At that time, patient's wife drove him to the emergency department after giving him 324 mg of ASA.  Patient denies lightheadedness, dizziness, palpitations, shortness of breath, cough, sore throat, fevers, chills, N/V/D, dysuria, hematuria, melanotic stools.  At this time, patient endorsing mild heartburn.  Chest pressure that he had prior to presentation is completely resolved.    In the ED:  -Vitals: HR 69, RR 20, /102, SpO2 98% RA  -Labs: CMP: Glucose 229, sodium 134, troponin 100             PT/INR 9.3/0.8             CBC: Unremarkable  -Imaging: CT angio chest abdomen pelvis: No aortic aneurysm or dissection.  Sliding-type hiatal hernia.  Scattered pulmonary nodules largest measuring 7 mm, a pleural-based 8 mm pulmonary nodule abutting the right horizontal fissure likely representing an intrapulmonary lymph node.  Scattered colonic diverticula.  No focal wall thickening of the sigmoid colon with mild adjacent stranding which may be seen in diverticulitis.  Age-indeterminate height loss of the L4  vertebral body.                  EKG: NSR at 67 bpm diffuse ST elevation in inferior leads II, 3, aVF, anterior leads V3-V6,.  -Intervention: After EKG obtained STEMI was alerted, and interventional cardiology was consulted in the emergency department and patient was taken to cardiac catheterization.  Labetalol 20 mg    PMH:   CAD (s/p multiple PCI's 2002, 2011, 2019 with stents to LCx, LAD, first obtuse marginal branch), CVA (s/p tPA with no residual deficits), factor V Leiden deficiency, HLD, HTN  Allergies: NKDA  FH: Noncontributory this presentation  SX H: Multiple PCI's in the past 2002, 2000 [place] 2019  SH: Former smoker 20-pack-year history, denies EtOH use or illicit drug use, lives at home with wife.    CODE STATUS: Full code    Past Medical History  No past medical history on file.    Surgical History  Past Surgical History:   Procedure Laterality Date    CARDIAC CATHETERIZATION N/A 5/1/2024    Procedure: Left Heart Cath, No LV;  Surgeon: Maurice Salmon MD;  Location: [facility] Cardiac Cath Lab;  Service: Cardiovascular;  Laterality: N/A;    CARDIAC CATHETERIZATION N/A 5/1/2024    Procedure: PCI;  Surgeon: Maurice Salmon MD;  Location: [facility] Cardiac Cath Lab;  Service: Cardiovascular;  Laterality: N/A;        Social History  He has no history on file for tobacco use, alcohol use, and drug use.    Family History  No family history on file.     Allergies  Patient has no known allergies.    Review of Systems  Patient denies all other symptomatology pertaining to 12 point ROS with exception of those listed above HPI  Physical Exam  General: Not in acute distress, A&O x 3, alert, cooperative, well-developed  HEENT: Normocephalic, atraumatic, EOMI, moist mucous membranes  Neck: Neck supple, trachea midline, no evidence of trauma  Cardiovascular: RRR, S1 and S2 appreciated, no murmurs rubs gallops appreciated, distal pulses 2+ bilaterally (radial and dorsalis pedis)  Respiratory: Vesicular breath sounds appreciated  "bilaterally, no adventitious sounds appreciated, no increased work of breathing on room air  GI: Abdomen soft, nondistended, nontender to palpation, bowel sounds present  Extremities: No edema appreciated in lower extremities bilaterally, no cyanosis, pressure band noted on right upper extremity.  Neuro: A&O X3, no focal deficits, strength and sensation intact bilaterally  Skin: Warm and dry, without lesions or rashes    Last Recorded Vitals  Blood pressure (!) 220/105, pulse 65, resp. rate 13, height 1.727 m (5' 8\"), weight 95.3 kg (210 lb), SpO2 98%.    Relevant Results  Scheduled medications  aspirin, 81 mg, oral, Daily  atorvastatin, 80 mg, oral, Nightly  labetaloL, 20 mg, intravenous, Once  losartan, 25 mg, oral, Daily  metoprolol tartrate, 25 mg, oral, BID  perflutren lipid microspheres, 0.5-10 mL of dilution, intravenous, Once in imaging  polyethylene glycol, 17 g, oral, Daily  ticagrelor, 90 mg, oral, BID      Continuous medications  eptifibatide, 2 mcg/kg/min  sodium chloride 0.9%, 75 mL/hr      PRN medications    CT angio chest abdomen pelvis    Result Date: 5/1/2024  Interpreted By:  Finkelstein, Evan, STUDY: CT ANGIO CHEST ABDOMEN PELVIS;  5/1/2024 10:11 pm   INDICATION: Signs/Symptoms:STEMI, R arm pain.   COMPARISON: Chest radiograph 05/03/2019   ACCESSION NUMBER(S): [id number]   ORDERING CLINICIAN: BO ROSALES   TECHNIQUE: Contiguous axial CT images of the chest , abdomen and pelvis were obtained without IV contrast.  Subsequent axial CTA images of the chest , abdomen and pelvis were obtained after administration of 90 mL Omnipaque 350 IV contrast using CT angiographic technique. Coronal and sagittal images are reconstructed.  3D reconstructions were obtained at a separate workstation.   FINDINGS: VASCULAR: AORTA: No intramural hematoma or displaced intimal calcifications on noncontrast imaging. No aortic aneurysm or dissection. Moderate atherosclerotic disease. Pulmonary artery:  Normal caliber. " No pulmonary embolus to the segmental level.   CHEST:   HEART: Normal size. No pericardial effusion. MEDIASTINUM AND ARYAN: No pathologically enlarged thoracic lymph nodes. Hiatal hernia. LUNG, PLEURA, LARGE AIRWAYS: 8 mm pulmonary nodule abutting the right horizontal fissure best seen image 143 of series 202. 3 mm pulmonary nodule in the right middle lobe image 154. Mild right basilar atelectasis. 7 mm pulmonary nodule in the left lower lobe image 200. CHEST WALL AND LOWER NECK: Mild bilateral gynecomastia.   ABDOMEN/PELVIS:   LIVER: Normal attenuation and contour. BILE DUCTS: Normal caliber. GALLBLADDER: No calcified gallstones. No wall thickening. SPLEEN: Unremarkable. PANCREAS: Unremarkable. ADRENALS:  Unremarkable. KIDNEYS, URETERS AND BLADDER: Symmetric renal enhancement. No hydronephrosis or perinephric fluid collection.  Bladder is within normal limits   REPRODUCTIVE ORGANS: No pelvic masses.   ABDOMINAL WALL: Fat containing inguinal hernias.   BOWEL: Normal caliber. Scattered colonic diverticula. Focal wall thickening of the sigmoid colon with mild adjacent stranding. Normal appendix. PERITONEUM: No ascites or free air, no fluid collection. RETROPERITONEUM: Within normal limits.   BONES: Age-indeterminate height loss of the L4 vertebral body. No bony retropulsion.       No aortic aneurysm or dissection.   Sliding-type hiatal hernia.   Scattered pulmonary nodules, largest measuring up to 7 mm. Recommend follow-up CT chest at 6-12 months as per Fleischner criteria. A pleural-based 8 mm pulmonary nodule abutting the right horizontal fissure likely represents an intrapulmonary lymph node.   Scattered colonic diverticula. Focal wall thickening of the sigmoid colon with mild adjacent stranding, which may be seen with diverticulitis. Correlate with clinical symptoms and consider nonemergent follow-up with colonoscopy.   Age-indeterminate height loss of the L4 vertebral body. Correlate with point tenderness.    MACRO: Critical Finding:  See findings. Notification was initiated on 5/1/2024 at 10:40 pm by  Evan Finkelstein.  (**-YCF-**) Instructions:   Signed by: Evan Finkelstein 5/1/2024 10:40 PM Dictation workstation:   [id number]   Results for orders placed or performed during the hospital encounter of 05/01/24 (from the past 24 hour(s))   CBC with Differential   Result Value Ref Range    WBC 8.3 4.4 - 11.3 x10*3/uL    nRBC 0.0 0.0 - 0.0 /100 WBCs    RBC 4.95 4.50 - 5.90 x10*6/uL    Hemoglobin 15.1 13.5 - 17.5 g/dL    Hematocrit 44.9 41.0 - 52.0 %    MCV 91 80 - 100 fL    MCH 30.5 26.0 - 34.0 pg    MCHC 33.6 32.0 - 36.0 g/dL    RDW 13.8 11.5 - 14.5 %    Platelets 252 150 - 450 x10*3/uL    Neutrophils % 40.1 40.0 - 80.0 %    Immature Granulocytes %, Automated 0.1 0.0 - 0.9 %    Lymphocytes % 45.6 13.0 - 44.0 %    Monocytes % 9.4 2.0 - 10.0 %    Eosinophils % 4.1 0.0 - 6.0 %    Basophils % 0.7 0.0 - 2.0 %    Neutrophils Absolute 3.31 1.60 - 5.50 x10*3/uL    Immature Granulocytes Absolute, Automated 0.01 0.00 - 0.50 x10*3/uL    Lymphocytes Absolute 3.77 (H) 0.80 - 3.00 x10*3/uL    Monocytes Absolute 0.78 0.05 - 0.80 x10*3/uL    Eosinophils Absolute 0.34 0.00 - 0.40 x10*3/uL    Basophils Absolute 0.06 0.00 - 0.10 x10*3/uL   Comprehensive Metabolic Panel   Result Value Ref Range    Glucose 229 (H) 74 - 99 mg/dL    Sodium 134 (L) 136 - 145 mmol/L    Potassium 3.9 3.5 - 5.3 mmol/L    Chloride 100 98 - 107 mmol/L    Bicarbonate 23 21 - 32 mmol/L    Anion Gap 15 10 - 20 mmol/L    Urea Nitrogen 24 (H) 6 - 23 mg/dL    Creatinine 1.17 0.50 - 1.30 mg/dL    eGFR 63 >60 mL/min/1.73m*2    Calcium 9.6 8.6 - 10.3 mg/dL    Albumin 4.5 3.4 - 5.0 g/dL    Alkaline Phosphatase 67 33 - 136 U/L    Total Protein 7.5 6.4 - 8.2 g/dL    AST 17 9 - 39 U/L    Bilirubin, Total 0.4 0.0 - 1.2 mg/dL    ALT 19 10 - 52 U/L   Protime-INR   Result Value Ref Range    Protime 9.3 (L) 9.8 - 12.8 seconds    INR 0.8 (L) 0.9 - 1.1   Troponin I, High Sensitivity,  Initial   Result Value Ref Range    Troponin I, High Sensitivity 100 (HH) 0 - 20 ng/L          Assessment/Plan   Principal Problem:    STEMI (ST elevation myocardial infarction) (Multi)  Active Problems:    Benign essential HTN    Dyslipidemia    CVA (cerebral vascular accident) (Multi)  Patient is an 79-year-old male with PMH significant for CAD (s/p multiple PCI's 2002, 2011, 2019 with stents to LCx, LAD, first obtuse marginal branch), CVA (s/p tPA with no residual deficits), factor V Leiden deficiency, HLD, HTN.  Patient presents to [facility] for chief complaint of substernal chest pressure as well as numbness and tingling in the right upper extremity.  In the emergency department patient was found to have STEMI with elevated troponin and EKG reflecting that with ST elevations in inferior and anterior leads.  STEMI alert was activated and interventional cardiology was reached out to.  Patient was taken to cardiac catheterization lab.  Patient underwent cardiac catheterization with stent placement.  Patient was admitted to internal medicine service for further management post STEMI and cardiac catheterization.    STEMI (s/p PCI on 5/1/2024) 2/2 CAD:  HTN  HLD  Patient presents to [facility] for chief complaint of chest pressure and right arm numbness.  -S/p  prior to presentation  -EKG illustrating ST elevations in inferior and anterior leads II, 3, aVF, V3-V6  -CT angio chest abdomen pelvis to rule out dissection in the setting of right upper extremity hemiballismus earlier today which resolved.  -Initial troponin 100  -STEMI alert was activated, interventional cardiology Dr. Salmon was consulted  -S/p cardiac catheterization  --> Acutely occluded LAD PCI with 2 EARLINE placement, chronically occluded circumflex, distal embolization of thrombus of LAD  ---> Double bolus of Integrilin and started on Integrilin drip for 18 hours, 5000 units heparin given  --> Patient loaded with 180 mg Brilinta, will continue with ASA 81  "mg indefinitely, Brilinta 90 mg p.o. twice daily for 12 months  -->Begin atorvastatin 80 mg daily, losartan 25 mg daily, metoprolol tartrate 25 mg twice daily  -Telemetry ordered  - A1c ordered    4.  Heartburn:  -Patient has no history of heartburn  -Likely secondary to uncoated aspirin that patient took prior to presentation.  -Will be given calcium carbonate 500 mg once.    Hx CVA  Factor V Leiden deficiency  -No medications taken for these conditions.    IVF: None at this time  Diet: Cardiac  DVT prophylaxis: None at this time as patient is on Integrilin drip  Dispo: Anticipate 1 to 2 days hospital stay  Consults: Interventional cardiology, Viky    CODE STATUS: Full code    Patient to be staffed with attending physician.    Luiz Jensen MD  Internal medicine PGY 1    =====================  Day team addendum  An 79-year-old with PMH of CAD, CVA who presented with STEMI.  Overnight, he underwent cardiac cath with findings consistent with acute occluded LAD and 2 drug-eluting stent were placed.  He was started on Integrilin drip (to be continued 18 hours after cath, end time about 6 PM) and loaded with Brilinta.  The patient seen examined this morning at bedside. The patient reported having shortness of breath and mild chest discomfort that is \"not unbearable\" in addition to nausea with no vomiting.  He does not have appetite to order the breakfast.  He denied palpitations, headache, lightheadedness/vertigo/lightheadedness.  Patient's wife was at bedside updated about the plan and their questions were answered and concerns were addressed.  On physical examination, he was sitting on the chair comfortably room air not in respiratory wheezing.  In pain.  Lung auscultation revealed good air entry bilaterally without any crackles or wheezing.  Card examination did show normal S1-S2 no with no added sounds or murmurs.  No lower limb edema.  -A1c 7.9.  Mild sliding scale and  glucose check ACHS.  -Echo " pending  -Zofran as needed for nausea.  -He needs to be on aspirin indefinitely, Brilinta for 12 months, high intensity statin (atorvastatin 80 mg).  Follow-up with cardiology outpatient.    -Rest as above.    Global Plan of Care  Fluid: PRN  Electrolytes: PRN  Nutrition: Cardiac  DVT Prophylaxis: None as he is on Integrilin drip.  Disposition: Anticipate 1-2 midnights pending for finishing Integrilin drip echo results.  Code Status: Full Code    Case discussed with the attending..  Signature: Carlene Kenney MD  Date: May 2, 2024  Please excuse any misspellings or unintended errors related to the Dragon speech recognition software used to dictate this note.

## 2024-05-02 NOTE — POST-PROCEDURE NOTE
Physician Transition of Care Summary  Invasive Cardiovascular Lab    Procedure Date: 5/1/2024  Attending:    * Maurice Salmon - Primary  Resident/Fellow/Other Assistant: Surgeons and Role:  * No surgeons found with a matching role *    Indications:   Pre-op Diagnosis     * STEMI (ST elevation myocardial infarction) (Multi) [I21.3]    Post-procedure diagnosis:   Post-op Diagnosis     * STEMI (ST elevation myocardial infarction) (Multi) [I21.3]    Procedure(s):   Left Heart Cath, No LV  36636 - [place] L HRT CATH W/NJX L VENTRICULOGRAPHY IMG S&I    PCI  46597 - [place] PRQ TRLUML CORONARY ANGIOPLASTY ONE ART/BRANCH        Procedure Findings:   Left dominant system.  Acutely occluded LAD.  Chronically occluded circumflex.  Left to left collaterals of the circumflex arising from a septal  of the LAD.    PCI of the mid LAD with overlapping [place] 2.5 x 12 mm and 2.5 x 26 mm MOE.    There was distal embolization of thrombus to the distal LAD.  Patient was started on Integrilin with a double bolus and continue drip for 18 hours.    Description of the Procedure:   Right radial, 6 Ethiopian sheath.  5 Ethiopian JR4 and XB 3.5 guide.  5000 units of heparin given.  Run-through wire used to cannulate the LAD.  Angioplasty with a 2.5 x 12 mm balloon followed by stenting with [place] 2.5 x 26 mm stent.  There is a proximal lesion to this stent and an overlapping 2.5 x 12 mm MOE was deployed.  Postdilated with a 2.5 mm noncompliant balloon.    The circumflex was probed with the run-through wire however would not easily cross.  Given the robust collaterals from the septal branch to the OM system this would appear to be a chronic total occlusion and not the culprit for the patient's presentation.    Complications:   None    Stents/Implants:   Implants       Stent    Stent, [place], 2.50 X 26rx - [id number] - Implanted        Inventory item: STENT, COREEN FRONTIER MOE, 2.50 X 26RX Model/Cat number: [id number]     : [employer] Lot number: [id number]    Device identifier: [id number]        As of 5/1/2024       Status: Implanted                      Stent, [place], 2.50 X 12rx - [id number] - Implanted        Inventory item: STENT, COREEN FRONTIER MOE, 2.50 X 12RX Model/Cat number: [id number]    : [employer] Lot number: [id number]    Device identifier: [id number]        As of 5/1/2024       Status: Implanted                              Anticoagulation/Antiplatelet Plan:   5000 units of heparin given.  Patient loaded with Brilinta 180 mg.  Had taken 325 mg of aspirin prior to arrival.    The patient was given a double bolus of Integrilin and started on a drip which we will continue for 18 hours.    Patient will be instructed to continue with aspirin 81 mg indefinitely, Brilinta 90 mg p.o. twice daily for 12 months.    Estimated Blood Loss:   * No values recorded between 5/1/2024 10:06 PM and 5/1/2024 10:36 PM *    Anesthesia: Moderate Sedation Anesthesia Staff: No anesthesia staff entered.    Any Specimen(s) Removed:   No specimens collected during this procedure.    Disposition:   Will be transferred to the floor      Electronically signed by: Maurice Salmon MD, 5/1/2024 10:36 PM

## 2024-05-02 NOTE — PROGRESS NOTES
05/02/24 1257   Discharge Planning   Living Arrangements Spouse/significant other   Support Systems Spouse/significant other   Assistance Needed none   Type of Residence Private residence   Number of Stairs to Enter Residence 3   Number of Stairs Within Residence 12  (Bilevel home)   Do you have animals or pets at home? Yes   Type of Animals or Pets 1 dog, 1 turtle, 1 bearded dragon   Who is requesting discharge planning? Provider   Home or Post Acute Services None   Patient expects to be discharged to: home   Does the patient need discharge transport arranged? No     Met with patient and spouse at bedside. Verified address and PCP. Uses [facility] in [place] for medications and has no issues in obtaining them, takes medications as directed. No ambulation devices used, still drives, owns own business, still works and is very independent with all his own ADL's. Patient states has no needs at this time and will return home on discharge. TCC team will follow patient for all discharge needs.

## 2024-05-02 NOTE — CONSULTS
Consults  History Of Present Illness:    Phi Hoffman is a 79 y.o. male presenting with chest pain.    Patient states that he has been having intermittent chest pain for the prior 5 days.  He went to workout and developed some right upper extremity twitching.  It resolved.  He went home but later that night developed severe crushing chest pain without radiation.  He came to the hospital for further evaluation.  He did take 4 baby aspirin prior to coming to the hospital.    He was hypertensive when he arrived and ECG demonstrated concerning ST changes in the inferior and anterior leads concerning for STEMI.  Because of his elevated blood pressure, 20 mmHg difference in blood pressures of the upper extremities, and his right arm pain a CT angio of the chest was performed to rule out dissection.  There was no evidence of aortic dissection and the patient was brought to the Cath Lab.    He was found to have a chronically occluded circumflex and an acutely occluded LAD.  He underwent PCI of the LAD.  There was distal embolization to the apical LAD and he was started on Integrilin at the end of the procedure.    Since that time his chest pain is improved.    The patient has a history of CAD and multiple PCI's in the past.  He has been on several statins however had significant muscle aches and stiffness on statins.  He previously had been driving quite a bit between [place] and [place] and found that he was extremely stiff with getting out of the car during breaks while he was on a statin.  He subsequently stopped his statins.    He was last seen in the office about 3 years ago.  He has been lost to follow-up.     Last Recorded Vitals:  Vitals:    05/02/24 0400 05/02/24 0800 05/02/24 0920 05/02/24 1200   BP: 173/80 (!) 190/90 175/86 160/73   BP Location: Left arm Left arm     Patient Position: Lying Sitting     Pulse: 52 56 54 53   Resp: 14 20 18 26   Temp: 36.4 °C (97.5 °F) 35.7 °C (96.3 °F)  36.3 °C (97.3 °F)    TempSrc: Temporal Temporal     SpO2: 99% 99%  100%   Weight:       Height:           Last Labs:  CBC - 5/2/2024:  4:58 AM  8.0 13.6 208    40.5      CMP - 5/2/2024:  4:58 AM  8.5 7.5 17 --- 0.4   2.8 3.7 19 67      PTT - No results in last year.  0.8   9.3 _     Troponin I, High Sensitivity   Date/Time Value Ref Range Status   05/02/2024 12:00 AM 3,161 (HH) 0 - 20 ng/L Final     Comment:     Previous result verified on 5/1/2024 2207 on specimen/case [id number] called with component [facility] for procedure Troponin I, High Sensitivity, Initial with value 100 ng/L.   05/01/2024 09:35  (HH) 0 - 20 ng/L Final     BNP   Date/Time Value Ref Range Status   04/18/2019 11:37  (H) 0 - 99 pg/mL Final     Comment:     .  <100 pg/mL - Heart failure unlikely  100-299 pg/mL - Intermediate probability of acute heart  .               failure exacerbation. Correlate with clinical  .               context and patient history.    >=300 pg/mL - Heart Failure likely. Correlate with clinical  .               context and patient history.  BNP testing is performed using different testing   methodology at [facility] than at other   [facility] hospitals. Direct result comparisons should   only be made within the same method.       Hemoglobin A1C   Date/Time Value Ref Range Status   05/01/2024 09:35 PM 7.9 (H) see below % Final   12/07/2023 07:48 AM 7.2 (H) see below % Final     LDL Calculated   Date/Time Value Ref Range Status   12/07/2023 07:48  (H) <=99 mg/dL Final     Comment:                                 Near   Borderline      AGE      Desirable  Optimal    High     High     Very High     0-19 Y     0 - 109     ---    110-129   >/= 130     ----    20-24 Y     0 - 119     ---    120-159   >/= 160     ----      >24 Y     0 -  99   100-129  130-159   160-189     >/=190       VLDL   Date/Time Value Ref Range Status   12/07/2023 07:48 AM 37 0 - 40 mg/dL Final   03/28/2023 07:25 AM 28 0 - 40 mg/dL Final  "  06/01/2022 08:40 AM 24 0 - 40 mg/dL Final   12/09/2019 07:23 AM 23 0 - 40 mg/dL Final      Last I/O:  I/O last 3 completed shifts:  In: 486.7 (4.9 mL/kg) [I.V.:486.7 (4.9 mL/kg)]  Out: 355 (3.6 mL/kg) [Urine:350 (0.1 mL/kg/hr); Blood:5]  Weight: 99.5 kg     Past Cardiology Tests (Last 3 Years):  EKG:  Electrocardiogram 12 Lead 05/02/2024 (Preliminary)      ECG 12 lead 05/01/2024 (Preliminary)    Echo:  Transthoracic Echo (TTE) Complete 05/02/2024    Ejection Fractions:  No results found for: \"EF\"  Cath:  Cardiac Catheterization Procedure 05/01/2024    Stress Test:  Nuclear Stress Test 09/09/2021    Cardiac Imaging:  No results found for this or any previous visit from the past 1095 days.      Past Medical History:  He has no past medical history on file.    Past Surgical History:  He has a past surgical history that includes Cardiac catheterization (N/A, 5/1/2024) and Cardiac catheterization (N/A, 5/1/2024).      Social History:  He reports that he has never smoked. He has never used smokeless tobacco. No history on file for alcohol use and drug use.    Family History:  No family history on file.     Allergies:  Patient has no known allergies.    Inpatient Medications:  Scheduled medications   Medication Dose Route Frequency    aspirin  81 mg oral Daily    atorvastatin  80 mg oral Nightly    calcium carbonate  500 mg oral Once    carvedilol  12.5 mg oral BID with meals    insulin lispro  0-10 Units subcutaneous TID with meals    labetaloL  20 mg intravenous Once    perflutren lipid microspheres  0.5-10 mL of dilution intravenous Once in imaging    polyethylene glycol  17 g oral Daily    sacubitriL-valsartan  2 tablet oral BID    ticagrelor  90 mg oral BID     PRN medications   Medication    dextrose    dextrose    ondansetron     Continuous Medications   Medication Dose Last Rate    eptifibatide  2 mcg/kg/min 2 mcg/kg/min (05/02/24 1600)     Outpatient Medications:  Current Outpatient Medications   Medication " Instructions    ascorbic acid (VITAMIN C) 500 mg, oral, Daily    bromelains 100 mg tablet,chewable 1 tablet, oral, Daily    docosahexaenoic acid 300 mg capsule 1 capsule, oral, Daily    empagliflozin-metformin (Synjardy) 12.5-1,000 mg 1 tablet, oral, Daily    KRILL-OM-3-DHA-EPA-PHOSPHO-AST ORAL oral    krill-om3-dha-epa-om6-lip-astx (Krill Oil, Omega 3 and 6,) 1000-130(40-80) mg capsule 1 capsule, oral, Daily    lactobacillus acidophilus (Bacid) tablet tablet 1 tablet, oral, Daily    multivitamin with minerals tablet 1 tablet, oral, Daily    soybean, fermented (Nattokinase) 50 mg capsule 1 capsule, oral, Daily    ubidecarenone (coenzyme Q10) 100 mg tablet 1 tablet, oral, Daily       Physical Exam:  In general: alert and in no acute distress.   HEENT: Carotid upstrokes normal with no bruits. JVP is normal.   Pulmonary: Clear to auscultation bilaterally.  Cardiovascular: S1,S2, regular.  II/VI systolic murmur at the apex.   Lower extremities: Warm. 2+ distal pulses. No edema.     Assessment/Plan   1) STEMI: Culprit LAD underwent PCI with overlapping EARLINE.  Distal embolization of the apical LAD.  Has been receiving Integrilin.    Was loaded with Brilinta and has been started on 90 mg p.o. twice daily along with baby aspirin.    2) dyslipidemia: Spoke to the patient and he is agreeable to restarting high intensity atorvastatin.    3) chronic systolic heart failure: EF 35 to 40%.  With his elevated blood pressure we have taken this opportunity to start him on carvedilol and Entresto.  As blood pressure tolerates we will add spironolactone if necessary.  He is already on SGLT2 inhibitor.    4) hypertension: See above.  Peripheral IV 05/01/24 20 G Right Antecubital (Active)   Site Assessment Clean;Dry;Intact 05/01/24 2135   Number of days: 1       Peripheral IV 05/01/24 20 G Left;Ventral Forearm (Active)   Site Assessment Clean;Dry;Intact 05/01/24 2135   Number of days: 1       Code Status:  Full Code    I spent 45 minutes  in the professional and overall care of this patient.        Maurice Salmon MD

## 2024-05-02 NOTE — CARE PLAN
Problem: Pain - Adult  Goal: Verbalizes/displays adequate comfort level or baseline comfort level  Outcome: Progressing     Problem: Safety - Adult  Goal: Free from fall injury  Outcome: Progressing     Problem: Discharge Planning  Goal: Discharge to home or other facility with appropriate resources  Outcome: Progressing     Problem: Chronic Conditions and Co-morbidities  Goal: Patient's chronic conditions and co-morbidity symptoms are monitored and maintained or improved  Outcome: Progressing     Problem: Fall/Injury  Goal: Not fall by end of shift  Outcome: Progressing  Goal: Be free from injury by end of the shift  Outcome: Progressing  Goal: Verbalize understanding of personal risk factors for fall in the hospital  Outcome: Progressing  Goal: Verbalize understanding of risk factor reduction measures to prevent injury from fall in the home  Outcome: Progressing  Goal: Pace activities to prevent fatigue by end of the shift  Outcome: Progressing     Problem: Pain  Goal: Takes deep breaths with improved pain control throughout the shift  Outcome: Progressing  Goal: Turns in bed with improved pain control throughout the shift  Outcome: Progressing  Goal: Walks with improved pain control throughout the shift  Outcome: Progressing  Goal: Performs ADL's with improved pain control throughout shift  Outcome: Progressing     Problem: ACS/CP/NSTEMI/STEMI  Goal: Chest pain managed (free from pain or at acceptable level)  Outcome: Progressing  Goal: Lab values return to normal range  Outcome: Progressing  Goal: Promote self management  Outcome: Progressing  Goal: Serial ECG will return to baseline  Outcome: Progressing     Problem: Cardiac catheterization  Goal: Free from dysrhythmias  Outcome: Progressing  Goal: Free from pain  Outcome: Progressing  Goal: No evidence of post procedure complications  Outcome: Progressing   The patient's goals for the shift include      The clinical goals for the shift include pt will have  no complaints of chest pain throughout the shift    Over the shift, the patient did not make progress toward the following goals. Barriers to progression include continue integrillin drip. Recommendations to address these barriers include monitor, observe, meds.

## 2024-05-03 ENCOUNTER — PHARMACY VISIT (OUTPATIENT)
Dept: PHARMACY | Facility: CLINIC | Age: 79
End: 2024-05-03
Payer: COMMERCIAL

## 2024-05-03 VITALS
BODY MASS INDEX: 33.25 KG/M2 | WEIGHT: 219.36 LBS | DIASTOLIC BLOOD PRESSURE: 58 MMHG | TEMPERATURE: 97.5 F | HEIGHT: 68 IN | RESPIRATION RATE: 17 BRPM | SYSTOLIC BLOOD PRESSURE: 121 MMHG | HEART RATE: 68 BPM | OXYGEN SATURATION: 97 %

## 2024-05-03 PROBLEM — E11.9 DIABETES MELLITUS (MULTI): Status: ACTIVE | Noted: 2024-05-03

## 2024-05-03 PROBLEM — E78.00 HYPERCHOLESTEROLEMIA: Status: ACTIVE | Noted: 2024-05-03

## 2024-05-03 PROBLEM — I10 HYPERTENSION: Status: ACTIVE | Noted: 2024-05-03

## 2024-05-03 PROBLEM — I25.10 CAD (CORONARY ARTERY DISEASE): Status: ACTIVE | Noted: 2024-05-03

## 2024-05-03 PROBLEM — I50.22 CHRONIC SYSTOLIC HEART FAILURE (MULTI): Status: ACTIVE | Noted: 2024-05-03

## 2024-05-03 PROBLEM — D68.51 FACTOR V LEIDEN MUTATION (MULTI): Status: ACTIVE | Noted: 2024-05-03

## 2024-05-03 LAB
ATRIAL RATE: 59 BPM
GLUCOSE BLD MANUAL STRIP-MCNC: 179 MG/DL (ref 74–99)
GLUCOSE BLD MANUAL STRIP-MCNC: 224 MG/DL (ref 74–99)
P AXIS: 46 DEGREES
P OFFSET: 207 MS
P ONSET: 137 MS
PR INTERVAL: 180 MS
Q ONSET: 227 MS
QRS COUNT: 10 BEATS
QRS DURATION: 106 MS
QT INTERVAL: 430 MS
QTC CALCULATION(BAZETT): 425 MS
QTC FREDERICIA: 427 MS
R AXIS: -81 DEGREES
T AXIS: 24 DEGREES
T OFFSET: 442 MS
VENTRICULAR RATE: 59 BPM

## 2024-05-03 PROCEDURE — 2500000001 HC RX 250 WO HCPCS SELF ADMINISTERED DRUGS (ALT 637 FOR MEDICARE OP): Performed by: INTERNAL MEDICINE

## 2024-05-03 PROCEDURE — 2500000006 HC RX 250 W HCPCS SELF ADMINISTERED DRUGS (ALT 637 FOR ALL PAYERS): Performed by: INTERNAL MEDICINE

## 2024-05-03 PROCEDURE — 2500000002 HC RX 250 W HCPCS SELF ADMINISTERED DRUGS (ALT 637 FOR MEDICARE OP, ALT 636 FOR OP/ED)

## 2024-05-03 PROCEDURE — RXMED WILLOW AMBULATORY MEDICATION CHARGE

## 2024-05-03 PROCEDURE — 99232 SBSQ HOSP IP/OBS MODERATE 35: CPT | Performed by: INTERNAL MEDICINE

## 2024-05-03 PROCEDURE — 82947 ASSAY GLUCOSE BLOOD QUANT: CPT

## 2024-05-03 PROCEDURE — 99239 HOSP IP/OBS DSCHRG MGMT >30: CPT

## 2024-05-03 PROCEDURE — 2500000004 HC RX 250 GENERAL PHARMACY W/ HCPCS (ALT 636 FOR OP/ED): Performed by: INTERNAL MEDICINE

## 2024-05-03 RX ORDER — ACETAMINOPHEN 325 MG/1
975 TABLET ORAL ONCE
Status: COMPLETED | OUTPATIENT
Start: 2024-05-03 | End: 2024-05-03

## 2024-05-03 RX ORDER — ATORVASTATIN CALCIUM 80 MG/1
80 TABLET, FILM COATED ORAL NIGHTLY
Qty: 30 TABLET | Refills: 0 | Status: SHIPPED | OUTPATIENT
Start: 2024-05-03 | End: 2024-06-04 | Stop reason: SDUPTHER

## 2024-05-03 RX ORDER — CARVEDILOL 12.5 MG/1
12.5 TABLET ORAL
Qty: 60 TABLET | Refills: 0 | Status: SHIPPED | OUTPATIENT
Start: 2024-05-03 | End: 2024-05-09 | Stop reason: ALTCHOICE

## 2024-05-03 RX ORDER — ASPIRIN 81 MG/1
81 TABLET ORAL DAILY
Qty: 30 TABLET | Refills: 0 | Status: SHIPPED | OUTPATIENT
Start: 2024-05-04 | End: 2024-06-03

## 2024-05-03 RX ORDER — SACUBITRIL AND VALSARTAN 49; 51 MG/1; MG/1
1 TABLET, FILM COATED ORAL 2 TIMES DAILY
Qty: 60 TABLET | Refills: 0 | Status: SHIPPED | OUTPATIENT
Start: 2024-05-03 | End: 2024-06-04 | Stop reason: SDUPTHER

## 2024-05-03 RX ORDER — FUROSEMIDE 10 MG/ML
20 INJECTION INTRAMUSCULAR; INTRAVENOUS ONCE
Status: COMPLETED | OUTPATIENT
Start: 2024-05-03 | End: 2024-05-03

## 2024-05-03 RX ORDER — FLUTICASONE PROPIONATE 50 MCG
2 SPRAY, SUSPENSION (ML) NASAL ONCE
Status: COMPLETED | OUTPATIENT
Start: 2024-05-03 | End: 2024-05-03

## 2024-05-03 RX ADMIN — FLUTICASONE PROPIONATE 2 SPRAY: 50 SPRAY, METERED NASAL at 00:35

## 2024-05-03 RX ADMIN — FUROSEMIDE 20 MG: 10 INJECTION, SOLUTION INTRAMUSCULAR; INTRAVENOUS at 12:52

## 2024-05-03 RX ADMIN — CARVEDILOL 12.5 MG: 12.5 TABLET, FILM COATED ORAL at 09:02

## 2024-05-03 RX ADMIN — ASPIRIN 81 MG: 81 TABLET, COATED ORAL at 09:01

## 2024-05-03 RX ADMIN — EMPAGLIFLOZIN 10 MG: 10 TABLET, FILM COATED ORAL at 12:54

## 2024-05-03 RX ADMIN — INSULIN LISPRO 2 UNITS: 100 INJECTION, SOLUTION INTRAVENOUS; SUBCUTANEOUS at 09:03

## 2024-05-03 RX ADMIN — INSULIN LISPRO 4 UNITS: 100 INJECTION, SOLUTION INTRAVENOUS; SUBCUTANEOUS at 12:54

## 2024-05-03 RX ADMIN — TICAGRELOR 90 MG: 90 TABLET ORAL at 09:01

## 2024-05-03 RX ADMIN — SACUBITRIL AND VALSARTAN 2 TABLET: 24; 26 TABLET, FILM COATED ORAL at 09:02

## 2024-05-03 RX ADMIN — ACETAMINOPHEN 975 MG: 325 TABLET ORAL at 00:34

## 2024-05-03 ASSESSMENT — COGNITIVE AND FUNCTIONAL STATUS - GENERAL
MOBILITY SCORE: 24
DAILY ACTIVITIY SCORE: 24

## 2024-05-03 ASSESSMENT — PAIN DESCRIPTION - DESCRIPTORS: DESCRIPTORS: ACHING

## 2024-05-03 ASSESSMENT — PAIN - FUNCTIONAL ASSESSMENT
PAIN_FUNCTIONAL_ASSESSMENT: 0-10

## 2024-05-03 NOTE — PROGRESS NOTES
Subjective Data:  Patient was a little dyspneic walking up and down the hallway.  Had very minimal chest discomfort.  Overall feels well.    Overnight Events:    No significant events overnight     Objective Data:  Last Recorded Vitals:  Vitals:    05/02/24 2000 05/03/24 0000 05/03/24 0400 05/03/24 0900   BP: 119/60 112/56 123/63 114/55   BP Location: Left arm Left arm Left arm Left arm   Patient Position: Lying Lying Lying Sitting   Pulse: 53 58 53 65   Resp: 15 17 16 16   Temp: 36.7 °C (98.1 °F) 36.4 °C (97.5 °F) 36.2 °C (97.2 °F) 36.4 °C (97.5 °F)   TempSrc: Temporal Temporal Temporal Temporal   SpO2: 97% 94% 99% 96%   Weight:       Height:           Last Labs:  CBC - 5/2/2024:  4:58 AM  8.0 13.6 208    40.5      CMP - 5/2/2024:  4:58 AM  8.5 7.5 17 --- 0.4   2.8 3.7 19 67      PTT - No results in last year.  0.8   9.3 _     TROPHS   Date/Time Value Ref Range Status   05/02/2024 12:00 AM 3,161 0 - 20 ng/L Final     Comment:     Previous result verified on 5/1/2024 2207 on specimen/case [id number] called with component [facility] for procedure Troponin I, High Sensitivity, Initial with value 100 ng/L.   05/01/2024 09:35  0 - 20 ng/L Final     BNP   Date/Time Value Ref Range Status   04/18/2019 11:37  0 - 99 pg/mL Final     Comment:     .  <100 pg/mL - Heart failure unlikely  100-299 pg/mL - Intermediate probability of acute heart  .               failure exacerbation. Correlate with clinical  .               context and patient history.    >=300 pg/mL - Heart Failure likely. Correlate with clinical  .               context and patient history.  BNP testing is performed using different testing   methodology at [facility] than at other   [facility] hospitals. Direct result comparisons should   only be made within the same method.       HGBA1C   Date/Time Value Ref Range Status   05/01/2024 09:35 PM 7.9 see below % Final   12/07/2023 07:48 AM 7.2 see below % Final     LDLCALC   Date/Time Value  "Ref Range Status   12/07/2023 07:48  <=99 mg/dL Final     Comment:                                 Near   Borderline      AGE      Desirable  Optimal    High     High     Very High     0-19 Y     0 - 109     ---    110-129   >/= 130     ----    20-24 Y     0 - 119     ---    120-159   >/= 160     ----      >24 Y     0 -  99   100-129  130-159   160-189     >/=190       VLDL   Date/Time Value Ref Range Status   12/07/2023 07:48 AM 37 0 - 40 mg/dL Final   03/28/2023 07:25 AM 28 0 - 40 mg/dL Final   06/01/2022 08:40 AM 24 0 - 40 mg/dL Final   12/09/2019 07:23 AM 23 0 - 40 mg/dL Final      Last I/O:  I/O last 3 completed shifts:  In: 1441.7 (14.5 mL/kg) [P.O.:820; I.V.:621.7 (6.2 mL/kg)]  Out: 2055 (20.7 mL/kg) [Urine:2050 (0.6 mL/kg/hr); Blood:5]  Weight: 99.5 kg     Past Cardiology Tests (Last 3 Years):  EKG:  Electrocardiogram 12 Lead 05/02/2024      ECG 12 lead 05/01/2024 (Preliminary)    Echo:  Transthoracic Echo (TTE) Complete 05/02/2024    Ejection Fractions:  No results found for: \"EF\"  Cath:  Cardiac Catheterization Procedure 05/01/2024    Stress Test:  Nuclear Stress Test 09/09/2021    Cardiac Imaging:  No results found for this or any previous visit from the past 1095 days.      Inpatient Medications:  Scheduled medications   Medication Dose Route Frequency    aspirin  81 mg oral Daily    atorvastatin  80 mg oral Nightly    calcium carbonate  500 mg oral Once    carvedilol  12.5 mg oral BID with meals    empagliflozin  10 mg oral Daily    insulin lispro  0-10 Units subcutaneous TID with meals    labetaloL  20 mg intravenous Once    perflutren lipid microspheres  0.5-10 mL of dilution intravenous Once in imaging    polyethylene glycol  17 g oral Daily    sacubitriL-valsartan  2 tablet oral BID    ticagrelor  90 mg oral BID     PRN medications   Medication    dextrose    dextrose    ondansetron     Continuous Medications   Medication Dose Last Rate       Physical Exam:  General: Alert and oriented and in " no apparent distress  HEENT: JVP normal.   Cardiovascular: S1, S2, regular.  II/VI systolic murmur at the apex  Respiratory: Clear to auscultation bilaterally  Lower extremities: Warm. Good distal pulses. No edema.       Assessment/Plan   1) anterior STEMI: PCI to the LAD.  Continue aspirin indefinitely.  Brilinta for 12 months.  Cardiac rehab ordered.    2) dyslipidemia: Tolerating high intensity statin. Did advise him to stop his krill oil as well as his [place] fish oil.  It would be fine for him to continue his co-Q10    3) chronic systolic heart failure: Tolerating carvedilol, Entresto, and Jardiance (is on combination pill at home with Jardiance and metformin).  Would like to give him a single IV dose of furosemide to see if that will help with his dyspnea.  His LVEDP was 20 mmHg on catheterization.  He does not appear overtly volume overloaded but I believe he will feel better with a single dose of furosemide.    Ultimately I do not believe that he needs intermittent diuretics at this time as he will be on Entresto as well as continuing with his Jardiance.    I am holding off on starting spironolactone at this time as his blood pressure has been well-controlled and we just started the carvedilol and Entresto.    4) diabetes    5) okay to discharge home later today if ambulating without shortness of breath.  Will need to follow-up either with myself or cardiology NP in 2 to 4 weeks.  Peripheral IV 05/01/24 20 G Right Antecubital (Active)   Site Assessment Clean;Dry;Intact 05/03/24 0900   Dressing Type Transparent 05/03/24 0900   Line Status Capped 05/03/24 0900   Dressing Status Clean;Dry;Occlusive 05/03/24 0900   Number of days: 2       Peripheral IV 05/01/24 20 G Left;Ventral Forearm (Active)   Site Assessment Clean;Dry;Intact 05/03/24 0900   Dressing Type Transparent 05/03/24 0900   Line Status Capped 05/03/24 0900   Dressing Status Clean;Dry;Occlusive 05/03/24 0900   Number of days: 2       Code  Status:  Full Code    I spent 40 minutes in the professional and overall care of this patient.        Maurice Salmon MD

## 2024-05-03 NOTE — DISCHARGE SUMMARY
Anesthetic History   No history of anesthetic complications            Review of Systems / Medical History  Patient summary reviewed, nursing notes reviewed and pertinent labs reviewed    Pulmonary  Within defined limits                 Neuro/Psych   Within defined limits           Cardiovascular  Within defined limits                Exercise tolerance: >4 METS     GI/Hepatic/Renal  Within defined limits             Comments: Pt takes Prevacid secondary to oral steroids, no Sx of DEMARCUS Endo/Other  Within defined limits           Other Findings   Comments: ITP           Physical Exam    Airway  Mallampati: II  TM Distance: 4 - 6 cm  Neck ROM: normal range of motion   Mouth opening: Normal     Cardiovascular  Regular rate and rhythm,  S1 and S2 normal,  no murmur, click, rub, or gallop             Dental  No notable dental hx       Pulmonary  Breath sounds clear to auscultation               Abdominal  GI exam deferred       Other Findings            Anesthetic Plan    ASA: 2  Anesthesia type: general          Induction: Intravenous  Anesthetic plan and risks discussed with: Patient Discharge Diagnosis  STEMI (ST elevation myocardial infarction) (Multi)    Issues Requiring Follow-Up  STEMI, HFrEF, HTN, HLD, DM.  Need to follow up with Dr. Salmon form cardiology in 2-4 weeks  Follow up with PCP    Discharge Meds     Your medication list        START taking these medications        Instructions Last Dose Given Next Dose Due   aspirin 81 mg EC tablet  Start taking on: May 4, 2024      Take 1 tablet (81 mg) by mouth once daily.       atorvastatin 80 mg tablet  Commonly known as: Lipitor      Take 1 tablet (80 mg) by mouth once daily at bedtime.       carvedilol 12.5 mg tablet  Commonly known as: Coreg      Take 1 tablet (12.5 mg) by mouth 2 times a day with meals.       Entresto 49-51 mg tablet  Generic drug: sacubitriL-valsartan      Take 1 tablet by mouth 2 times a day.       ticagrelor 90 mg tablet  Commonly known as: Brilinta      Take 1 tablet (90 mg) by mouth 2 times a day              CONTINUE taking these medications        Instructions Last Dose Given Next Dose Due   ascorbic acid 500 mg tablet  Commonly known as: Vitamin C           Bacid 1 billion cell- 250 mg tablet tablet  Generic drug: lactobacillus acidophilus           coenzyme Q10 100 mg tablet           multivitamin with minerals tablet           Synjardy 12.5-1,000 mg  Generic drug: empagliflozin-metformin                  STOP taking these medications      bromelains 100 mg tablet,chewable        docosahexaenoic acid 300 mg capsule        Krill Oil (Omega 3 and 6) 1000-130(40-80) mg capsule  Generic drug: krill-om3-dha-epa-om6-lip-astx        KRILL-OM-3-DHA-EPA-PHOSPHO-AST ORAL        Nattokinase 50 mg capsule  Generic drug: soybean, fermented                  Where to Get Your Medications        These medications were sent to [facility]  [address] [place] [address], [address], [facility] [address]      Hours: 8:30 AM to 5 PM Mon-Fri, 9 AM to 1 PM Sat Phone: [phone]   aspirin 81 mg EC tablet  atorvastatin 80 mg  tablet  carvedilol 12.5 mg tablet  Entresto 49-51 mg tablet  ticagrelor 90 mg tablet         Test Results Pending At Discharge  Pending Labs       No current pending labs.            Hospital Course  Sheri Hoffman is An 79-year-old with PMH of CAD, CVA who presented to [facility] with a STEMI. He underwent cardiac cath with findings consistent with acute occluded LAD and 2 drug-eluting stent were placed. He was started on Integrilin drip (to be continued 18 hours after cath, end time about 6 PM) and loaded with Brilinta. Patient was started on Asa 81 and brillinta. Echocardiogram during his stay showed LVEF of 35-40% with mid and apical anterior wall and septum abnormalities, Impaired LV relaxation, mild to moderately increased Left atrium and mild to moderate mitral regurgitation. He was started on Entresto and carvedilol for GDMT and his home SGLT2 was continued. Patient remained hemodynamically stable during his stay. He was given a one time dose of Lasix for some dyspnea on exertion which resolved after lasix. It resolved and he could ambulate without dyspnea. He does not require regular lasix at this time per Dr. Salmon. He is to follow up with Dr. Salmon in 2-4 weeks for his heart failure and CAD/STEMI. Reviewed follow up and importance of medication adherence with patient to which he was agreeable.      Pertinent Physical Exam At Time of Discharge  Physical Exam  Constitutional:       General: He is not in acute distress.     Appearance: Normal appearance. He is not ill-appearing, toxic-appearing or diaphoretic.   HENT:      Head: Normocephalic and atraumatic.      Nose: Nose normal.      Mouth/Throat:      Mouth: Mucous membranes are moist.      Pharynx: Oropharynx is clear.   Cardiovascular:      Rate and Rhythm: Normal rate and regular rhythm.      Pulses: Normal pulses.      Heart sounds: Murmur heard.   Pulmonary:      Effort: Pulmonary effort is normal.      Breath sounds: No  wheezing, rhonchi or rales.   Abdominal:      General: Abdomen is flat. There is no distension.      Palpations: Abdomen is soft.      Tenderness: There is no abdominal tenderness. There is no guarding.   Musculoskeletal:         General: No tenderness.      Right lower leg: No edema.      Left lower leg: No edema.   Skin:     General: Skin is warm and dry.      Capillary Refill: Capillary refill takes less than 2 seconds.   Neurological:      General: No focal deficit present.      Mental Status: He is alert and oriented to person, place, and time.      Gait: Gait normal.   Psychiatric:         Mood and Affect: Mood normal.         Behavior: Behavior normal.         Outpatient Follow-Up  Call to schedule an appointment with Dr. Salmon in 2-4 weeks      Nick Cardoso DO

## 2024-05-03 NOTE — NURSING NOTE
THE PT HAS HAD AN UNEVENTFUL NIGHT POST CARDIAC CATH WITH PLACEMENT OF X2 STENTS ALONG WITH A STEMI. . HAD COMPLAINED OF A HEADACHE AND SOME MID STERNAL SORENESS EARLIER IN THE SHIFT ALONG WITH NASAL CONGESTION AND STUFFINESS. THE CHEST SORENESS THE PT STATED HE'S HAD ALL DAY. DOES  NOT RADIATE ANYWHERE, IT IS NOT A PAIN  OR CHANGE IN CHARACTERISTICS WITH DEEP BREATHING OR MOVEMENT. THE PT DENIES ANY DIZZINESS, PALPITATIONS, NAUSEA, VISION CHANGES, OR N/T X4 EXT.  ALL HIS COMPLAINTS RESOLVED WITH THE AIDE OF FLONASE AND TYLENOL PER  DR JOSEPH. THE PTS RT WRIST DSG IS D+I. HE HAS 2+ PULSES. HIS FINGERS ARE PINK AND WARM TO TOUCH. HAS SOME ECCHYMOSIS DISCOLORATION BENEATH HIS DSG BUT IT IS FLAT AND NON TENDER TO TOUCH, THE PT IS ON RA. STATED HIS SOB HAS MARKEDLY IMPROVED FROM WHEN HE FIRST CAME IN. O2 SATS REMAIN STABLE AND NO SOB WAS OBSERVED. THE PT . REMAINS ON RA. BS ARE CTA BUT DIMINISHED.  DEEP BREATHING EXERCISES WERE ENC. WHEN AWAKE.  THE PTS EKG-SB  WITH OCC PVC,S.  HIS . HR WAS OBSERVED TO BE 46-60 BTS/MIN. THROUGHOUT THIS SHIFT. THE PT AMBULATES WELL WITH A X1 ASSIST. STATED HE TOUCHES STUFF WITHIN HIS REACH WHEN AT HOME TO STEADY HIMSELF.  HE IS CALLING APPROPRIATELY  AS REQUESTED FOR BRP OR WHEN WANTING OOB.  HE HAS BEEN A+O X3. VERY PLEASANT  AND SOCIABLE TO CONTACT.  THE PT WILL PROBABLY BE DISCHARGED HOME LATER TODAY.  SLEEP ENCOURAGED FOR AS LONG AS POSSIBLE. PT SAFETY WAS MAINTAINED THIS SHIFT.

## 2024-05-03 NOTE — NURSING NOTE
Cardiac Rehab: Patient seen for qualifying diagnosis to cardiac rehab program. Described and discussed Phase II Cardiac Rehab program with patient, gave [facility] Handbook and pamphlet, and discussed enrollment into Phase II. Reviewed cardiac lifestyle modifications necessary for improved heart health. Encouraged cardiology follow up post hospitalization. Discussed the importance of medication compliance. Cardiac rehab staff will contact patient following discharge for enrollment in program.

## 2024-05-03 NOTE — CARE PLAN
Problem: Pain - Adult  Goal: Verbalizes/displays adequate comfort level or baseline comfort level  Outcome: Adequate for Discharge     Problem: Safety - Adult  Goal: Free from fall injury  Outcome: Adequate for Discharge     Problem: Discharge Planning  Goal: Discharge to home or other facility with appropriate resources  Outcome: Adequate for Discharge     Problem: Chronic Conditions and Co-morbidities  Goal: Patient's chronic conditions and co-morbidity symptoms are monitored and maintained or improved  Outcome: Adequate for Discharge     Problem: Fall/Injury  Goal: Not fall by end of shift  Outcome: Adequate for Discharge  Goal: Be free from injury by end of the shift  Outcome: Adequate for Discharge  Goal: Verbalize understanding of personal risk factors for fall in the hospital  Outcome: Adequate for Discharge  Goal: Verbalize understanding of risk factor reduction measures to prevent injury from fall in the home  Outcome: Adequate for Discharge  Goal: Use assistive devices by end of the shift  Outcome: Adequate for Discharge  Goal: Pace activities to prevent fatigue by end of the shift  Outcome: Adequate for Discharge     Problem: Pain  Goal: Takes deep breaths with improved pain control throughout the shift  Outcome: Adequate for Discharge  Goal: Turns in bed with improved pain control throughout the shift  Outcome: Adequate for Discharge  Goal: Walks with improved pain control throughout the shift  Outcome: Adequate for Discharge  Goal: Performs ADL's with improved pain control throughout shift  Outcome: Adequate for Discharge  Goal: Participates in PT with improved pain control throughout the shift  Outcome: Adequate for Discharge  Goal: Free from opioid side effects throughout the shift  Outcome: Adequate for Discharge  Goal: Free from acute confusion related to pain meds throughout the shift  Outcome: Adequate for Discharge     Problem: ACS/CP/NSTEMI/STEMI  Goal: Chest pain managed (free from pain or at  acceptable level)  Outcome: Adequate for Discharge  Goal: Lab values return to normal range  Outcome: Adequate for Discharge  Goal: Promote self management  Outcome: Adequate for Discharge  Goal: Serial ECG will return to baseline  Outcome: Adequate for Discharge  Goal: Verbalize understanding of procedures/devices  Outcome: Adequate for Discharge  Goal: Wean vasopressors/achieve hemodynamic stability  Outcome: Adequate for Discharge     Problem: Cardiac catheterization  Goal: Free from dysrhythmias  Outcome: Adequate for Discharge  Goal: Free from pain  Outcome: Adequate for Discharge  Goal: No evidence of post procedure complications  Outcome: Adequate for Discharge  Goal: Promote self management  Outcome: Adequate for Discharge  Goal: Verbalize understanding of procedure  Outcome: Adequate for Discharge  Goal: Care and maintenance of device (specify)  Outcome: Adequate for Discharge   The patient's goals for the shift include      The clinical goals for the shift include pain-free  Patient met all goals prior to discharge

## 2024-05-03 NOTE — CARE PLAN
The patient's goals for the shift include  GETTING MEDICATION TO CLEAR UP HIS STUFFY NOSE.    The clinical goals for the shift include THE PT WILL BE HDS, HAVE NO POST CARDIAC CATHERIZATION COMPLICATIONS OR ARRYTHMIAS, REMAIN NEURO INTACT  AND HAVE BE  pain-free, BY THE END OF THIS SHIFT.

## 2024-05-03 NOTE — DISCHARGE INSTR - ACTIVITY
Wash the puncture site gently with mild soap and water and apply Band-Aid to the site for a few days to keep it clean and dry.    Do not submerge your hand in dishwater, bathtubs, or other water sources for 3 days.  Avoid flexing the wrist (hammering, playing tennis, or swinging objects) for 3 days.  Do not lift more than 5 pounds for 72 hours.  OK to drive short distances after 24 hours. No restriction after 3 days.    Go to the hospital immediately or call 911 if bleeding or swelling of the site (apply manual pressure directly over the access site), loss of sensation in your hands or fingers, or redness, swelling, or discharge at the procedure site.    For minor discomfort, take Tylenol as prescribed by your MD, elevate the affected arm, and apply an ice pack for swelling or comfort.

## 2024-05-03 NOTE — DISCHARGE INSTRUCTIONS
"During your hospital admission had a heart attack was subsequently found heart failure.  Started on multiple medications help prevent further clots in your coronary arteries and to help your heart recover.  He was seen by cardiologist Dr. Salmon.    Please schedule an appointment to follow-up with Dr. Salmon recovery for your heart attack as well as renal failure.    He will likely be enrolled in the pediatric rehab program restore heart function.    You should also schedule an appointment with your primary care provider for hospital follow-up to help coordinate care hospitalization.    Will also be given referral to the \"healthy at home\" program should help you with questions regarding your medication.    You are started on multiple new medications.  To these medications are aspirin and Brilinta.  You will take these daily.  This will help prevent further plaques in your coronary arteries both within the stents and the reports of your arteries without stents. This works by preventing platelets from aggregating in your arteries    You were started on a high intensity statin therapy. This will help to reduce cholesterol that can cause plaques and blockages as well as stabilize and prevent plaque inflammation and prevent plaque rupture in your arteries.    You were started on a beta blocker called carvedilol. This helps reduce workload on the heart and helps reduce deleterious \"remodeling\" after a heart attack.    You were also started on Entresto. This helps by reducing blood pressure in the kidneys which reduces the workload on the heart. This also helps reduce deleterious \"remodeling\" of the heart.     The Synjardy that you are already taking is another great medication that helps with heart recovery by controlling blood sugars as well as heart failure by reducing inflammation as well as reducing sodium content that can increase blood pressure and put stress on the heart.     Some of these medications I have " given you only thirty days of fills form the pharmacy. You will likely need a longer course of these medications, but I will defer to your primary care provider and cardiologist who will follow up with you. They can refill more of these medicines for you. It is important that you make appointments to follow up with them.     As we discussed earlier, it is important to take these medications. These will help restore your heart function and increase chances that we can prevent further heart attacks. You can continue to take your home supplements as long as they do not cause harmful side effects. You can review these with your PCP.     Thank you for allowing us to take care of you during your hospital stay [place] [facility]

## 2024-05-03 NOTE — HOSPITAL COURSE
Sheri Hoffman is An 79-year-old with PMH of CAD, CVA who presented to [facility] with a STEMI. He underwent cardiac cath with findings consistent with acute occluded LAD and 2 drug-eluting stent were placed. He was started on Integrilin drip (to be continued 18 hours after cath, end time about 6 PM) and loaded with Brilinta. Patient was started on Asa 81 and brillinta. Echocardiogram during his stay showed LVEF of 35-40% with mid and apical anterior wall and septum abnormalities, Impaired LV relaxation, mild to moderately increased Left atrium and mild to moderate mitral regurgitation. He was started on Entresto and carvedilol for GDMT and his home SGLT2 was continued. Patient remained hemodynamically stable during his stay. He was given a one time dose of Lasix for some dyspnea on exertion which resolved after lasix. It resolved and he could ambulate without dyspnea. He does not require regular lasix at this time per Dr. Salmon. He is to follow up with Dr. Salmon in 2-4 weeks for his heart failure and CAD/STEMI. Reviewed follow up and importance of medication adherence with patient to which he was agreeable.

## 2024-05-03 NOTE — DISCHARGE INSTR - AVS FIRST PAGE
Heart Failure Discharge Instructions  1. Weigh yourself daily and record on your weight calendars.  2. If you gain more than 2 or 3 pounds overnight or 5 pounds in 5 days, call your cardiologist Dr. Maurice Salmon [phone].  3. Follow a low sodium diet. No more than 2000 mg in one day, or more than 700 mg per meal.  4. Limit total fluids to no more than 8 cups (or 2 liters) per day - this includes all fluids (water, coffee, juice, milk, tea, etc.)  5. Monitor your blood pressure and heart rate in the morning, then take your meds, than check blood pressure and heart rate a few hours later and record on your calendars.  6. Be sure to see your cardiologist in one week after discharge. Call to schedule your follow-up appointments when you get home if they were not already scheduled for you.  7. Keep your follow-up appointments! Bring your weight calendars with you so the doctors can see your weight trend and blood pressure readings.  8. Be sure to  any new prescriptions and take them as directed. If unsure of the medications, be sure to call your cardiologist.  9. Stay as active as you can tolerate.   10. If you notice subtle change of symptoms (slight increase in swelling, slight shortness of breath, a new intolerance to lying flat, a new cough), be sure to call your cardiologist.  11. If you have any questions or concerns or you have not heard back from the cardiologist, feel free to call [facility] heart failure navigator at [phone].    Be sure to follow-up at Cardiac Rehab, [address] [place] [address] [place], [place] [address], [phone]

## 2024-05-05 NOTE — NURSING NOTE
Cardiovascular Nurse Navigator Education     Patient admitted 5/1/24 with intermittent brief twinges of chest pains for 5 days and right hand numbness and diaphoresis just prior to admit. Wife administered  mg and brought patient to ER. EKG shows IWMI and STEMI alert activated. /110 at max. Trop 100, BUN/creat 24/1.17 (eGFR 63). CT ruled out aortic dissection but reveals scattered pulmonary nodules with largest 7 mm, scattered diverticula and sigmoid colon thickening indicative of possible acute diverticulitis.     He was taken to the cath lab and received PCI/stents x2 to mid-LAD with distal embolization resulting in Integrilin initiation. Circumflex chronically occluded with L-L collateral flow from LAD. He has had prior PCI/stents in the past.    Echo 5/1: EF 35-40%, multiple wall motion abnormalities, impaired relaxation of LF diastolic filling, La mod dilated, mild/mod MR.    GDMT: ASA (new), Brilinta (new), Carvedilol 12.5 mg BID (new), Entresto 49/51 BID (new), Atorvastatin 80 mg at bedtime (new), along with home Synjardy. He takes multiple supplements at home as well.    I met with patient and wife today for an education session for CAD, stents, and new-onset combined systolic/diastolic HF. Bedside nurse expressed concern for medication compliance due to their beliefs in use of nutritional supplements and no cardiac meds PTA.  Therefore, a detailed explanation was given of CAD and stents. The [employer] leeroy was used for video assistance. Heart failure disease education, medication management including indications, dosing, side effects, and scheduling, daily self-assessment including weight, BP, pulse, and symptoms, sodium and fluid restriction, importance of one-week follow-up cardiology appointment, and exercise to tolerance discussed. Patient and wife provided with heart failure education materials including the  Living with Heart Failure Booklet and weight calendars.   Dr. Salmon  "rounded during this session and reinforced information. Supplement use discussed at length and he explained which supplements were acceptable for pt to use at this time and which should be held. Primary team notified as they placed several of them on the [place] med rec that Dr. Salmon requested to hold.  Patient and wife were very amenable to this session and grateful for the time spent in this thorough explanation, admitting they were unaware of many concepts presented. Patient has a scale and BP cuff and agrees to daily self-monitoring including weight, blood pressure, heart rate, and HF symptoms. Patient now understands yellow-zone symptoms and agrees to notify cardiologist if they develop.   Patient has a cpap for HEATHER but has not been using it due to mask comfort. We discussed multiple options and he agrees to check his supplies and switch masks and try again. I encouraged him to discuss a new Rx for cpap with his PCP so that supplies can be sent again (he uses [facility] in [facility]).   They are already on a strict organic heart healthy diet, focusing on lean meats, vegetables, and low inflammatory foods. A few of their choices were higher in sodium and alternatives offered. Wife had a misconception about the amount of sodium in a \"healthier\" salt such as Himalayan or pink salt. I agreed with her that those have trace minerals but it is 98% sodium chloride and she was surprised. She agrees to watch the amount they add in their meat prep.  I printed his AVS and discussed discharge instructions with them, including the HF instructions and right radial site care. Patient has my contact information for any concerns/questions.   "

## 2024-05-07 ENCOUNTER — TELEPHONE (OUTPATIENT)
Dept: CARDIOLOGY | Facility: CLINIC | Age: 79
End: 2024-05-07
Payer: MEDICARE

## 2024-05-07 NOTE — TELEPHONE ENCOUNTER
Patient wife called patient is retaining water she would like to talk to you about his medication and he had a heart attack aziza May 1st please advise    [phone]

## 2024-05-07 NOTE — DOCUMENTATION CLARIFICATION NOTE
"    PATIENT:               ELIZABETH IZQUIERDO  ACCT #:                  [id number]  MRN:                       [id number]  :                       1945  ADMIT DATE:       2024 9:27 PM  DISCH DATE:        5/3/2024 4:13 PM  RESPONDING PROVIDER #:        [id number]          PROVIDER RESPONSE TEXT:    STEMI not related to in-stent restenosis    CDI QUERY TEXT:    Clarification    Instruction:    Based on your assessment of the patient and the clinical information, please provide the requested documentation by clicking on the appropriate radio button and enter any additional information if prompted.    Question: Please clarify by indicating if there is a relationship between the past stent to LAD and current STEMI w/two EARLINE placed to LAD    When answering this query, please exercise your independent professional judgment. The fact that a question is being asked, does not imply that any particular answer is desired or expected.    The patient's clinical indicators include:  Clinical Information:  79 yr old male presenting w/CP and SOB.  Admitted for STEMI and taken to Cath lab w/two EARLINE placed to LAD.    Clinical Indicators:  Troponin: 100, 3161 on .    ED physician documentation  0922 PM states \"PMH CAD s/p 8 stents\"    Cardiology post-procedure note  1006 PM states \"Acutely occluded LAD\" and \"PCI of the mid LAD with overlapping McMullen Mac 2.5 x 12 mm and 2.5 x 26 mm EARLINE.\"    HP by IM  1200 AM states \" CAD s/p multiple PCI's , , 2019 with stents to LCx, LAD..\"    Treatment: Troponin x2.  EKGs.  STEMI alert.  Cardiology consult.  Aspirin, Lipitor, Integrillin Gtt, Brilinta.  C w/PCI to LAD w/two EARLINE.    Risk Factors: Elderly male w/hx of CAD w/previous stents to LAD and LCx, HTN, CHF, HLD, Former Smoker.  Options provided:  -- STEMI due to in-stent restenosis of LAD  -- STEMI not related to in-stent restenosis  -- Other - I will add my own diagnosis  -- Refer to Clinical Documentation " Reviewer    Query created by: Macey Dacosta on 5/3/2024 9:16 AM      Electronically signed by:  KAMARI RODRIGUEZ MD 5/7/2024 7:11 AM

## 2024-05-07 NOTE — NURSING NOTE
Cardiovascular Nurse Navigator Education Discharge Follow-Up    Wife called in today and expressed concerns that pt has complained to her of slight shortness of breath, mild weight increase, fatigue, and mild dizziness. She ran out of time yesterday to make dc appt with Dr. Salmon but will call the office today. I advised her to be sure to call and request that he calls back. At time of this writing, Dr. Salmon rounded and was informed of the above and he will call patient back. They have been compliant with medications, daily weights, diet, BP checks, and yellow-zone monitoring. We ended the call with her promises to call Dr. Salmon's office.

## 2024-05-08 DIAGNOSIS — I50.22 CHRONIC SYSTOLIC HEART FAILURE (MULTI): Primary | ICD-10-CM

## 2024-05-08 RX ORDER — FUROSEMIDE 40 MG/1
40 TABLET ORAL DAILY PRN
Qty: 30 TABLET | Refills: 3 | Status: SHIPPED | OUTPATIENT
Start: 2024-05-08 | End: 2025-05-08

## 2024-05-08 NOTE — PROGRESS NOTES
I spoke to the patient and his wife on the telephone.  He has become short of breath.  Initially he said it was all the time but then after trying to explain the shortness of breath associated with Brilinta he agrees that there is an intermittent component to it.    He has gained about 3 pounds since coming home.  He was 205 pounds when he first got home and now weighs 208 pounds.    I have asked him to start furosemide 40 mg once a day for the next 3 days and then as needed for weight gain or shortness of breath.    If his shortness of breath significantly improves then no further interventions are needed.    If he has improvement of shortness of breath but still has intermittent shortness of breath I explained that could be the Brinlinta and I would like him to continue to observe to see if it will eventually go away.    In the event that his weight comes down but he is still short of breath then we could consider switching the Brilinta to clopidogrel if needed.

## 2024-05-09 ENCOUNTER — OFFICE VISIT (OUTPATIENT)
Dept: CARDIOLOGY | Facility: CLINIC | Age: 79
End: 2024-05-09
Payer: MEDICARE

## 2024-05-09 VITALS
WEIGHT: 207 LBS | DIASTOLIC BLOOD PRESSURE: 56 MMHG | BODY MASS INDEX: 31.37 KG/M2 | HEIGHT: 68 IN | HEART RATE: 46 BPM | OXYGEN SATURATION: 95 % | SYSTOLIC BLOOD PRESSURE: 108 MMHG

## 2024-05-09 DIAGNOSIS — I50.23 ACUTE ON CHRONIC SYSTOLIC HEART FAILURE (MULTI): ICD-10-CM

## 2024-05-09 DIAGNOSIS — I10 BENIGN ESSENTIAL HTN: Chronic | ICD-10-CM

## 2024-05-09 DIAGNOSIS — R00.1 SINUS BRADYCARDIA: ICD-10-CM

## 2024-05-09 DIAGNOSIS — I50.22 CHRONIC SYSTOLIC HEART FAILURE (MULTI): Primary | ICD-10-CM

## 2024-05-09 DIAGNOSIS — R19.7 DIARRHEA, UNSPECIFIED TYPE: ICD-10-CM

## 2024-05-09 DIAGNOSIS — Z98.61 CAD S/P PERCUTANEOUS CORONARY ANGIOPLASTY: ICD-10-CM

## 2024-05-09 DIAGNOSIS — I25.10 CAD S/P PERCUTANEOUS CORONARY ANGIOPLASTY: ICD-10-CM

## 2024-05-09 PROCEDURE — 1160F RVW MEDS BY RX/DR IN RCRD: CPT | Performed by: NURSE PRACTITIONER

## 2024-05-09 PROCEDURE — 1111F DSCHRG MED/CURRENT MED MERGE: CPT | Performed by: NURSE PRACTITIONER

## 2024-05-09 PROCEDURE — 1159F MED LIST DOCD IN RCRD: CPT | Performed by: NURSE PRACTITIONER

## 2024-05-09 PROCEDURE — 1036F TOBACCO NON-USER: CPT | Performed by: NURSE PRACTITIONER

## 2024-05-09 PROCEDURE — 3078F DIAST BP <80 MM HG: CPT | Performed by: NURSE PRACTITIONER

## 2024-05-09 PROCEDURE — 3074F SYST BP LT 130 MM HG: CPT | Performed by: NURSE PRACTITIONER

## 2024-05-09 PROCEDURE — 99214 OFFICE O/P EST MOD 30 MIN: CPT | Performed by: NURSE PRACTITIONER

## 2024-05-09 RX ORDER — CARVEDILOL 6.25 MG/1
6.25 TABLET ORAL
Start: 2024-05-09 | End: 2025-05-09

## 2024-05-09 NOTE — TELEPHONE ENCOUNTER
Phi wife has called and she would like you to know that he is having very bad diarrhea and I got him a appointment with Whit today.

## 2024-05-09 NOTE — PROGRESS NOTES
Name : Phi Hoffman   : 1945   MRN : [id number]   ENC Date : 2024    Primary Cardiologist: Dr. Salmon     CC: Hospital Follow-up STEMI s/p PCI     HPI:    Phi Hoffman is a 79 y.o. male with Pmhx sig for CAD s/p multiple PCIs, cryptogenic stroke, HTN, DLD, DM who presents today as above.    Presented to [facility] on 24 with intermittent chest pain for the prior 5 days.  He went to workout and developed some right upper extremity twitching. It resolved.  He went home but later that night developed severe crushing chest pain without radiation which prompted hospital evaluation.    He was hypertensive when he arrived and ECG demonstrated concerning ST changes in the inferior and anterior leads concerning for STEMI.  Because of his elevated blood pressure, 20 mmHg difference in blood pressures of the upper extremities, and his right arm pain a CT angio of the chest was performed to rule out dissection.  There was no evidence of aortic dissection and he was taken to Cath Lab.     He was found to have a chronically occluded circumflex and an acutely occluded LAD.  He underwent PCI of the LAD.  There was distal embolization to the apical LAD and he was started on Integrilin at the end of the procedure. Chest pain improved. Echo post PCI showed new reduction in EF 35-40%. He was medically optimized & discharged     He was doing well but over the past weekend his wife noticed weight gain. And over the last 3 days he is having constant diarrhea. His wife thinks it's his Coreg or Brilinta based on her research. Dr. Salmon wrote for Lasix without knowing about the diarrhea. He has not taken his lasix yet because pharmacy didn't have it.    Notable today is his Bradycardia, HR 46 bpm    Retired . His wife is a medical     CV Diagnostics:  Echo 24: EF 35-40%, see results for rest of impression     Echocardiogram 19 demonstrated normal LV size and function, EF 55-60%. Mild MR.  No evidence of ASD     The patient was admitted to [facility] on April 18, 2019 with typical chest pain. He had initially elected to have stress testing however developed recurrent chest pain with ST elevations and went to the cardiac catheterization lab emergently.     4/18/19 PCI to the LAD with overlapping Resolute Nahma 2.75 x 22 mm and 2.5 x 34 mm EARLINE to the proximal to mid LAD. He also underwent overlapping PCI from the circumflex into the first obtuse marginal branch with overlapping Resolute Mac 2.75 x 12 mm in 2.5 x 22 mm EARLINE.     The patient has had 2 sets of interventions with the first being in 2002 in the next being in 2011. He has had a total of 4 stents to his dominant circumflex. In 2011 any present with an MI and there was discussion of CABG. His circumflex was occluded and is LAD had a severe stenosis noted. He underwent stenting of the occluded circumflex and was told that he would need to be reassessed 5-6 months later for the possibility of CABG. At that follow-up he was told that he did not need CABG.     Catheterization 11/11/02 with PCI to the circumflex with a 3 x 13 mm velocity HepaCoat stent. Angioplasty to the left PDA with a 2.5 x 23 mm balloon     Catheterization with PCI to the circumflex 9/4/11. Circumflex stented with integrity 3.0 x 30 mm and 3.5 x 12 mm bare-metal stents.     Echocardiogram 9/23/11 demonstrating EF of 40-45%. Akinesis of the inferior wall and hypokinesis of the posterior lateral wall. Grade 1 diastolic dysfunction. Moderate MR.     ROS: unless otherwise noted in the history of present illness, all other systems were reviewed and they are negative for complaints     Allergies:  Patient has no known allergies.    Current Outpatient Medications   Medication Instructions    ascorbic acid (VITAMIN C) 500 mg, oral, Daily    aspirin 81 mg, oral, Daily    atorvastatin (LIPITOR) 80 mg, oral, Nightly    carvedilol (COREG) 6.25 mg, oral, 2 times daily with meals     empagliflozin (JARDIANCE) 25 mg, oral, Daily    furosemide (LASIX) 40 mg, oral, Daily PRN    lactobacillus acidophilus (Bacid) tablet tablet 1 tablet, oral, Daily    multivitamin with minerals tablet 1 tablet, oral, Daily    sacubitriL-valsartan (Entresto) 49-51 mg tablet 1 tablet, oral, 2 times daily    ticagrelor (Brilinta) 90 mg tablet Take 1 tablet (90 mg) by mouth 2 times a day    ubidecarenone (coenzyme Q10) 100 mg tablet 1 tablet, oral, Daily        Last Labs:  CBC  Lab Results   Component Value Date    WBC 8.0 05/02/2024    HGB 13.6 05/02/2024    HCT 40.5 (L) 05/02/2024    MCV 91 05/02/2024     05/02/2024       CMP  Lab Results   Component Value Date    CALCIUM 8.5 (L) 05/02/2024    PHOS 2.8 05/02/2024    PROT 7.5 05/01/2024    ALBUMIN 3.7 05/02/2024    AST 17 05/01/2024    ALT 19 05/01/2024    ALKPHOS 67 05/01/2024    BILITOT 0.4 05/01/2024       BMP   Lab Results   Component Value Date     (L) 05/02/2024    K 3.9 05/02/2024     05/02/2024    CO2 19 (L) 05/02/2024    GLUCOSE 182 (H) 05/02/2024    BUN 20 05/02/2024    CREATININE 0.84 05/02/2024       LIPID PANEL   Lab Results   Component Value Date    CHOL 292 (H) 12/07/2023    TRIG 184 (H) 12/07/2023    HDL 45.1 12/07/2023    CHHDL 6.5 12/07/2023    LDLF 191 (H) 03/28/2023    VLDL 37 12/07/2023    NHDL 247 (H) 12/07/2023       RENAL FUNCTION PANEL   Lab Results   Component Value Date    GLUCOSE 182 (H) 05/02/2024     (L) 05/02/2024    K 3.9 05/02/2024     05/02/2024    CO2 19 (L) 05/02/2024    ANIONGAP 14 05/02/2024    BUN 20 05/02/2024    CREATININE 0.84 05/02/2024    GFRMALE 59 (A) 03/28/2023    CALCIUM 8.5 (L) 05/02/2024    PHOS 2.8 05/02/2024    ALBUMIN 3.7 05/02/2024        Lab Results   Component Value Date     (H) 04/18/2019    HGBA1C 7.9 (H) 05/01/2024     I have reviewed the above labs & diagnostics    Last Recorded Vitals:  Vitals:    05/09/24 1105   BP: 108/56   BP Location: Left arm   Patient Position:  "Sitting   Pulse: (!) 46   SpO2: 95%   Weight: 93.9 kg (207 lb)   Height: 1.727 m (5' 8\")     Physical Exam:  On exam Mr. Phi Hoffman appears his stated age, is alert and oriented x3, and in no acute distress. His sclera are anicteric and his oropharynx has moist mucous membranes. His neck is supple and without thyromegaly. The JVP is ~5 cm of water above the right atrium. His cardiac exam has regular rhythm, normal S1, S2. No S3/4. There are no murmurs. His lungs are clear to auscultation bilaterally and there is no dullness to percussion. His abdomen is soft, nontender with normoactive bowel sounds. There is no HJR. The extremities are warm and without edema. The skin is dry. There is no rash present. The distal pulses are 2-3+ in all four extremities. His mood and affect are appropriate for todays encounter.     Assessment/Plan:  CAD s/p PCI. No c/o angina. C/w ASA, Brilinita, high dose statin & BB    Acute Systolic Heart Failure. LVEF 35-40%. Weight gain of 3lbs but that was prior to this persistent diarrhea. Would hold off on lasix for now. Continue on BB, Entresto & Jardiance.    Bradycardia. Though Asymptomatic, will decrease Coreg to 6.25mg BID    Diarrhea. Though he has been on metformin for years, of the medications he is on this is the higher probability culprit. Will stop metformin. Asked him to follow up with PCP for stool evaluation- r/o Cdiff as he was just hospitalized. Increase electrolyte repletion drinks while having active diarrhea    HTN. Well controlled on current regimen    DLD. Will need repeat lipid panel in 3-6 months now that he is on high dose statin    Asked Phi to call me next week & let me know if his symptoms have improved to determine in office follow up timeframe with me. Will get him scheduled to see Dr. Salmon though in the next 3-6 months    Tracy M Schwab, APRN-CNP    "

## 2024-05-09 NOTE — PATIENT INSTRUCTIONS
- decrease Coreg to 6.25mg twice a day  - stop synjardy  - start Jardiance 25mg once a day  - would hold off on taking lasix for now because of losing water/dehydration from diarrhea.  - Follow up with primary on the diarrhea, should rule out cdiff  - call me next week & let me know how you are doing  - Follow up with Dr. Salmon in 3 months    It was my pleasure to meet you. I look forward to being your cardiac Nurse Practitioner. I am a huge believer in communicating with my patients. Please contact me at any time, if anything is not clear to you regarding anything we have discussed, or if new questions occur to you.

## 2024-05-10 ENCOUNTER — TRANSCRIBE ORDERS (OUTPATIENT)
Dept: CARDIAC REHAB | Facility: CLINIC | Age: 79
End: 2024-05-10
Payer: MEDICARE

## 2024-05-10 DIAGNOSIS — Z95.5 S/P CORONARY ARTERY STENT PLACEMENT: Primary | ICD-10-CM

## 2024-05-21 ENCOUNTER — CLINICAL SUPPORT (OUTPATIENT)
Dept: CARDIAC REHAB | Facility: CLINIC | Age: 79
End: 2024-05-21
Payer: MEDICARE

## 2024-05-21 DIAGNOSIS — Z95.5 S/P CORONARY ARTERY STENT PLACEMENT: Primary | ICD-10-CM

## 2024-05-21 PROCEDURE — 93798 PHYS/QHP OP CAR RHAB W/ECG: CPT | Performed by: INTERNAL MEDICINE

## 2024-05-21 NOTE — PROGRESS NOTES
INDIVIDUAL CARDIAC TREATMENT PLAN-INITIAL ASSESSMENT     Name: Phi Hoffman   Today's Date: 24   : 1945    Primary Provider: FELIBERTO Castillo  MRN: [id number]    Referring Physician: RAMON Salmon     Diagnosis: STEMI/ Stents x 2 LAD    Onset Date: 24      Risk Stratification: High      NUTRITION ASSESSMENT  Lipids:   Lipid Lab Date: 23  Total Chol: 292  HDL: 45.1  LDL: 210  Tri  Cholesterol Med: Atorvastatin 80mg @ HS    Diabetes: Yes  HgbA1c: 7.9%  Date Checked: 24  Monitors glucose at home: No; waiting to receive freestyle edwin to check BS  Fasting Blood Sugar Range:  Frequency:  Hypoglycemic Episode: Denies    Weight Management  Weight: 211 lbs  Height: 68 inches  BMI:  32.1  Pre Body Composition: 27%  Post Body Composition:  Pre Waist Circumference: 45 inches  Post Waist Circumference:  Current Diet: Heart Healthy; Wife makes sure he follows cardiac diet well.  Barriers to dietary change: Denies    Initial Dietary Assessment Score: Pending results from dietician   Discharge Dietary Assessment Score:       NUTRITION PLAN  Nutrition Goals:   1. Improve Picture Your Plate assessment results by discharge.  2. Make changes to diet to include heart healthy options while in the program.    Nutrition Intervention/Education:   *Sent dietician Picture Your Plate assessment for scoring and recommendations.   *Perform weekly weight checks on .   *Body Composition completed by Exercise Physiologist 24 with goal weight: 196 lbs and short term goal to lose 1 inch off waist by discharge.      OTHER CORE COMPONENTS/ RISK FACTORS ASSESSMENT  Medication compliance: good compliance  Using pill box: Yes  Carries medication list: Yes    Blood Pressure Management:  History of High BP: Yes; well controlled on medications  Resting BP: 118/58    Tobacco: FORMER  Form of tobacco: Cigarettes; 1 PPD x 20 yrs  Quit Date:    Anyone in the house smoke: No    Initial Knowledge Test Score:  "10/15  Discharge Knowledge Test Score:    OTHER CORE COMPONENTS/ RISK FACTOR PLAN   Other Core components/Risk Factor Goals:                                                                                                                                                    1.  Increase knowledge test score by discharge.  2.  Decrease dyspnea on exertion while in the program.    Other Components/ Risk Factors Intervention/Education:  *Will continue to monitor HR, BP, dyspnea and arrhythmias each session.   *Will meet with patient to discuss goals & progress.  *Encouraged review of education materials.       PSYCHOSOCIAL ASSESSMENT  Patient reported stress level: moderate; work related  Using stress management skills: Yes; keeps things in perspective; does art and exercise but keeps very focused and has purpose with all activities; does not see them as \"relaxing.\"  HX of anxiety: Yes; remote; has learned coping techniques  HX of depression: No  Patient questioned regarding any new stress, depression and anxiety symptoms: No    Family/Support System: Wife, [employer]  Seeing mental health provider: No  Psychosocial medications:    Initial PHQ-9 score: 10 (Moderate Risk)  Discharge PHQ-9 score:   Was PHQ-9 faxed to provider: No  Date faxed:    Quality of Life Survey: SF-36 Pre Post   Physical Component Score 55.86 TBD   Mental Component Score 30.72 TBD     Stages of change:  Preparation    PSYCHOSOCIAL PLAN  Psychosocial Goals:  1. Improve stage of change from \"Preparation\" to \"Action\" while in the program.  2. Improve PHQ-9 category classification from \"Moderate Risk\" to \"Mild Risk\" while in the program.     Psychosocial Interventions/ Education:  * Provided one on one emotional support and will facilitate peer support within the context of other phase II patients while in the program.       EXERCISE ASSESSMENT  Home Exercise: Yes  Frequency: 6 days/week for 45 minutes  Mode:  [employer]; 15 minutes on treadmill plus " resistance exercises (alternates upper and lower body days) ; does moderate intensity    EXERCISE PLAN  Exercise Goals:   1. Goal of 5.6 METs by discharge.  2. Have a plan in place for continued exercise after the program by discharge.    Exercise Prescription:   Based on 12 Minute Walk Test  Frequency: 3 days per week  Duration (total aerobic min.): 30 minutes  Intensity RPE: 11-14  Target HR: Rest +30; (age-predicted: )  MET Level Range: 1.6-3.6     Modality METS Load  Duration   1 Warm Up    05:00   2 Upright Bike 3 30 tejada Level 1 06:00   3 Arms Airdyne 1.6 0.4 load  06:00   4 Treadmill 3.6 2.2 mph 3% 06:00   5 NuStep 2.2 40 tejada Level 4 06:00   6 Recumbent Bike 3 30 tejada Level 1 06:00   7 Cool Down     05:00     Exercise Intervention/Education:   *Aim to progress 1 MET every 6 Weeks or as tolerated.  *Incorporate resistance training for muscular endurance and strength.      Date of first exercise session: Pending MD signature      LEARNING ASSESSMENT & BARRIERS  Readiness to Learn: Eager to learn  Barriers: has had cataract surgery for full vision; does not use reading glasses; mild Cahto  Comments:    FALL RISK  moderate  Comments:  Patient has steady gait on treadmill but does have neuropathy in feet.    INDIVIDUAL PATIENT GOALS:   Learn breathing techniques to improve shortness of breath while in the program.    2.    Return to regular exercise routine at [facility] by discharge.    MEDICATIONS  Aspirin 81mg daily; Atorvastatin 80mg daily; Cardivedilol 12.5mg BID; Ticagrelor 90mg BID; Entresto 49-51mg BID; Ascorbic Acid 500mg daily; Empagliflozin 25mg daily; Multivitamin 1 tab QID; Probiotic  1 tab daily; CoQ10 100mg daily; Zinc 1 tab daily; Homocysteine support 1 tab daily; Vitamin D3+K2 1 tab daily; Ubiquinol 100mg daily; Focus IQ 1 tab daily; N-Acetyl L-Cysteine 1 tab daily; Whole B complex 1 tab daily; Juice plus vegetable+sindhu blend 1 tab daily; Cyruta plus 1 tab daily; Cardio plus 1 tab  daily; Glutathione 100mg daily; Turmeric carcumin 1 tab daily; Ginkgo biloba 120mg daily    STAFF COMMENTS:   Patient here for orientation into phase 2 cardiac rehab, following STEMI/STENTx2 to LAD on 5/1/24.  Monitor showing SB-SR, PVCs with inverted T waves noted.  Has been working out at [employer] 6 days/week, since MI/Stents - using treadmill for 15 minutes and resistance training with weights. Dr. Salmon is aware.  Patient has noticed mild dyspnea since event, but is newly prescribed Brilinta, and feeling it to be a side effect. Demonstrates appropriate HR and BP response to exercise. Achieved 4 METS with no reported angina or discomfort  when questioned.  Reports mild dyspnea with satuations 98% on RA. Notes fleeting side stitch on left side during last interal. Recovers appropriately.  Planning to participate in 7:45am class, verbalizes understanding he will continue with exercise sessions once initial ITP is signed by MD.  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

## 2024-05-21 NOTE — PROGRESS NOTES
CARDIAC ASSESSMENT     Name: Phi Hoffman   : 1945   Diagnosis: STEMI/ Stents x 2 LAD  MRN: [id number]   Onset Date: 24  Today's Date: 24      Cardiovascular   HX: 10 stents total;multiple PCI's , ,  (Lcx-90% with good collaterals, LAD, 1st obtuse marginal), 3 remote MI's, HTN, HLD, T2DM , HFrEF   Family HX of CAD:  No  Angina: Yes, can tell when something is going on in body  Describe: chest pressure, cold sweat, nausea  Last Episode: 24  History of Heart Failure: Yes  EF: 35-40%  Onset of HF:   Last HF hospitalization: Denies  Family HX of HF:  No  HF symptoms: Fatigue with exertion;  takes nap in afternoon; sleeps well; dyspnea with exertion just since recent Stents and attributes it to Brilinta but feels it is improving each week.    Devices: Denies  HX of PAD: No    Arrythmias: Sinus bradycardia  Apical: regular  Heart Rate: 46  BP: 118/58  Radial pulses:  R Present 2+ L Present 2+    Comments:      Respiratory  HX: former smoker 1 PPD x 20 yrs; quit   Dyspnea:  Yes  Describe: only with exertion  HX HEATHER:  Yes  CPAP Use:  Yes; has worn in past; getting new one today  Family History of Lung Disease:  No    Resting O2 sat: 96%  Lung Sounds: Clear  Locations: all lobes    Comments:    Neurological   Orientation: oriented to person, place, time, and general circumstances  HX: Denies  History of stroke/TIA?: Stroke 2019; still has mild tremors Left arm; sometimes says wrong words     Comments: neuropathy in feet; sees podiatrist every month      Skin  Skin Color: pink, warm, dry  Edema: None     Comments:    Gastrointestinal/Genitourinary  HX: Denies  Comments: Had recent diarrhea after Stents but has resolved.      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  Caretaker of family  member?:  No  Do you feel safe at home?:  Yes    Caffeinated drinks per day: 5 cups coffee  Alcoholic drinks per day/week: 0  HX drug or alcohol abuse: No  Current use of illicit drugs: No  Marijuana use: No    Comments:    Musculoskeletal  HX of injury/surgery: Denies    Comments:    Pain Assessment  Current pain: Denies  Location:  Description:    Comments:    Fall Risk Assessment  Assistive device: no device  Needs assistance:  No  Afraid of falling:  No  Fall within the past 6 months?: No  Injured with fall:  Fall risk results: moderate

## 2024-05-21 NOTE — PROGRESS NOTES
INDIVIDUAL CARDIAC TREATMENT PLAN-INITIAL ASSESSMENT     Name: Phi Hoffman   Today's Date: 24   : 1945    Primary Provider: FELIBERTO Castillo  MRN: [id number]    Referring Physician: RAMON Salmon     Diagnosis: STEMI/ Stents x 2 LAD    Onset Date: 24      Risk Stratification: High      NUTRITION ASSESSMENT  Lipids:   Lipid Lab Date: 23  Total Chol: 292  HDL: 45.1  LDL: 210  Tri  Cholesterol Med: Atorvastatin 80mg @ HS    Diabetes: Yes  HgbA1c: 7.9%  Date Checked: 24  Monitors glucose at home: No; waiting to receive freestyle edwin to check BS  Fasting Blood Sugar Range:  Frequency:  Hypoglycemic Episode: Denies    Weight Management  Weight: 211 lbs  Height: 68 inches  BMI:  32.1  Pre Body Composition: 27%  Post Body Composition:  Pre Waist Circumference: 45 inches  Post Waist Circumference:  Current Diet: Heart Healthy; Wife makes sure he follows cardiac diet well.  Barriers to dietary change: Denies    Initial Dietary Assessment Score: Pending results from dietician   Discharge Dietary Assessment Score:       NUTRITION PLAN  Nutrition Goals:   1. Improve Picture Your Plate assessment results by discharge.  2. Make changes to diet to include heart healthy options while in the program.    Nutrition Intervention/Education:   *Sent dietician Picture Your Plate assessment for scoring and recommendations.   *Perform weekly weight checks on .   *Body Composition completed by Exercise Physiologist 24 with goal weight: 196 lbs and short term goal to lose 1 inch off waist by discharge.      OTHER CORE COMPONENTS/ RISK FACTORS ASSESSMENT  Medication compliance: good compliance  Using pill box: Yes  Carries medication list: Yes    Blood Pressure Management:  History of High BP: Yes; well controlled on medications  Resting BP: 118/58    Tobacco: FORMER  Form of tobacco: Cigarettes; 1 PPD x 20 yrs  Quit Date:    Anyone in the house smoke: No    Initial Knowledge Test Score:  "10/15  Discharge Knowledge Test Score:    OTHER CORE COMPONENTS/ RISK FACTOR PLAN   Other Core components/Risk Factor Goals:                                                                                                                                                    1.  Increase knowledge test score by discharge.  2.  Decrease dyspnea on exertion while in the program.    Other Components/ Risk Factors Intervention/Education:  *Will continue to monitor HR, BP, dyspnea and arrhythmias each session.   *Will meet with patient to discuss goals & progress.  *Encouraged review of education materials.       PSYCHOSOCIAL ASSESSMENT  Patient reported stress level: moderate; work related  Using stress management skills: Yes; keeps things in perspective; does art and exercise but keeps very focused and has purpose with all activities; does not see them as \"relaxing.\"  HX of anxiety: Yes; remote; has learned coping techniques  HX of depression: No  Patient questioned regarding any new stress, depression and anxiety symptoms: No    Family/Support System: Wife, [employer]  Seeing mental health provider: No  Psychosocial medications:    Initial PHQ-9 score: 10 (Moderate Risk)  Discharge PHQ-9 score:   Was PHQ-9 faxed to provider: No  Date faxed:    Quality of Life Survey: SF-36 Pre Post   Physical Component Score 55.86 TBD   Mental Component Score 30.72 TBD     Stages of change:  Preparation    PSYCHOSOCIAL PLAN  Psychosocial Goals:  1. Improve stage of change from \"Preparation\" to \"Action\" while in the program.  2. Improve PHQ-9 category classification from \"Moderate Risk\" to \"Mild Risk\" while in the program.     Psychosocial Interventions/ Education:  * Provided one on one emotional support and will facilitate peer support within the context of other phase II patients while in the program.       EXERCISE ASSESSMENT  Home Exercise: Yes  Frequency: 6 days/week for 45 minutes  Mode:  [employer]; 15 minutes on treadmill plus " resistance exercises (alternates upper and lower body days) ; does moderate intensity    EXERCISE PLAN  Exercise Goals:   1. Goal of 5.6 METs by discharge.  2. Have a plan in place for continued exercise after the program by discharge.    Exercise Prescription:   Based on 12 Minute Walk Test  Frequency: 3 days per week  Duration (total aerobic min.): 30 minutes  Intensity RPE: 11-14  Target HR: Rest +30; (age-predicted: )  MET Level Range: 1.6-3.6     Modality METS Load  Duration   1 Warm Up    05:00   2 Upright Bike 3 30 tejada Level 1 06:00   3 Arms Airdyne 1.6 0.4 load  06:00   4 Treadmill 3.6 2.2 mph 3% 06:00   5 NuStep 2.2 40 tejada Level 4 06:00   6 Recumbent Bike 3 30 tejada Level 1 06:00   7 Cool Down     05:00     Exercise Intervention/Education:   *Aim to progress 1 MET every 6 Weeks or as tolerated.  *Incorporate resistance training for muscular endurance and strength.      Date of first exercise session: Pending MD signature      LEARNING ASSESSMENT & BARRIERS  Readiness to Learn: Eager to learn  Barriers: has had cataract surgery for full vision; does not use reading glasses; mild [place]  Comments:    FALL RISK  moderate  Comments:  Patient has steady gait on treadmill but does have neuropathy in feet.    INDIVIDUAL PATIENT GOALS:   Learn breathing techniques to improve shortness of breath while in the program.    2.    Return to regular exercise routine at [facility] by discharge.    MEDICATIONS  Aspirin 81mg daily; Atorvastatin 80mg daily; Cardivedilol 12.5mg BID; Ticagrelor 90mg BID; Entresto 49-51mg BID; Ascorbic Acid 500mg daily; Empagliflozin 25mg daily; Multivitamin 1 tab QID; Probiotic  1 tab daily; CoQ10 100mg daily; Zinc 1 tab daily; Homocysteine support 1 tab daily; Vitamin D3+K2 1 tab daily; Ubiquinol 100mg daily; Focus IQ 1 tab daily; N-Acetyl L-Cysteine 1 tab daily; Whole B complex 1 tab daily; Juice plus vegetable+sindhu blend 1 tab daily; Cyruta plus 1 tab daily; Cardio plus 1 tab  daily; Glutathione 100mg daily; Turmeric carcumin 1 tab daily; Ginkgo biloba 120mg daily    STAFF COMMENTS:   Patient here for orientation into phase 2 cardiac rehab, following STEMI/STENTx2 to LAD on 5/1/24.  Monitor showing SB-SR, PVCs with inverted T waves noted.  Has been working out at [employer] 6 days/week, since MI/Stents - using treadmill for 15 minutes and resistance training with weights. Dr. Salmon is aware.  Patient has noticed mild dyspnea since event, but is newly prescribed Brilinta, and feeling it to be a side effect. Demonstrates appropriate HR and BP response to exercise. Achieved 4 METS with no reported angina or discomfort  when questioned.  Reports mild dyspnea with satuations 98% on RA. Notes fleeting side stitch on left side during last interal. Recovers appropriately.  Planning to participate in 7:45am class, verbalizes understanding he will continue with exercise sessions once initial ITP is signed by MD.  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

## 2024-05-24 ENCOUNTER — CLINICAL SUPPORT (OUTPATIENT)
Dept: CARDIAC REHAB | Facility: CLINIC | Age: 79
End: 2024-05-24
Payer: MEDICARE

## 2024-05-24 DIAGNOSIS — Z95.5 S/P CORONARY ARTERY STENT PLACEMENT: ICD-10-CM

## 2024-05-24 PROCEDURE — 93798 PHYS/QHP OP CAR RHAB W/ECG: CPT

## 2024-05-27 NOTE — PROGRESS NOTES
INITIAL PICTURE YOUR PLATE ASSESSMENT  CARDIAC REHAB    SCORES:  Vegetables & Fruit (out of 12)                 8   Breads, Grains & Cereals (out of 12)        6  Red & Processed Meat (out of 12)         11  Poultry (out of 2)                                   2  Fish & Shellfish (out of 4)                      3  Beans, Nuts & Seeds (out of 4)             1  Milk & Dairy Foods (out of 6)              5  Toppings, Oils, Seasonings & Salt (out of 20)    16  Sweets, Snacks & Restaurant Food (out of 14)    14  Beverages (out of 10)          8     Overall Score (out of 96)    74    DIAGNOSIS  Overall healthy diet.   Inadequate intake of beans in diet    GOALS  Aim to include a serving of beans in the diet weekly.  [place] and Nut Tip Sheet provided.     Patient provided dietitian phone number for any further diet related questions.

## 2024-05-29 ENCOUNTER — CLINICAL SUPPORT (OUTPATIENT)
Dept: CARDIAC REHAB | Facility: CLINIC | Age: 79
End: 2024-05-29
Payer: MEDICARE

## 2024-05-29 DIAGNOSIS — Z95.5 S/P CORONARY ARTERY STENT PLACEMENT: ICD-10-CM

## 2024-05-29 PROCEDURE — 93798 PHYS/QHP OP CAR RHAB W/ECG: CPT

## 2024-05-31 ENCOUNTER — CLINICAL SUPPORT (OUTPATIENT)
Dept: CARDIAC REHAB | Facility: CLINIC | Age: 79
End: 2024-05-31
Payer: MEDICARE

## 2024-05-31 DIAGNOSIS — Z95.5 S/P CORONARY ARTERY STENT PLACEMENT: ICD-10-CM

## 2024-05-31 PROCEDURE — 93798 PHYS/QHP OP CAR RHAB W/ECG: CPT

## 2024-06-03 ENCOUNTER — CLINICAL SUPPORT (OUTPATIENT)
Dept: CARDIAC REHAB | Facility: CLINIC | Age: 79
End: 2024-06-03
Payer: MEDICARE

## 2024-06-03 DIAGNOSIS — Z95.5 S/P CORONARY ARTERY STENT PLACEMENT: ICD-10-CM

## 2024-06-03 LAB
ATRIAL RATE: 67 BPM
P AXIS: 51 DEGREES
P OFFSET: 202 MS
P ONSET: 147 MS
PR INTERVAL: 152 MS
Q ONSET: 223 MS
QRS COUNT: 11 BEATS
QRS DURATION: 108 MS
QT INTERVAL: 406 MS
QTC CALCULATION(BAZETT): 429 MS
QTC FREDERICIA: 421 MS
R AXIS: -44 DEGREES
T AXIS: 69 DEGREES
T OFFSET: 426 MS
VENTRICULAR RATE: 67 BPM

## 2024-06-03 PROCEDURE — 93798 PHYS/QHP OP CAR RHAB W/ECG: CPT

## 2024-06-04 DIAGNOSIS — I25.10 CORONARY ARTERY DISEASE INVOLVING NATIVE HEART, UNSPECIFIED VESSEL OR LESION TYPE, UNSPECIFIED WHETHER ANGINA PRESENT: ICD-10-CM

## 2024-06-04 DIAGNOSIS — I10 HYPERTENSION, UNSPECIFIED TYPE: ICD-10-CM

## 2024-06-04 DIAGNOSIS — E78.5 DYSLIPIDEMIA: Chronic | ICD-10-CM

## 2024-06-04 DIAGNOSIS — I21.3 STEMI (ST ELEVATION MYOCARDIAL INFARCTION) (MULTI): ICD-10-CM

## 2024-06-04 DIAGNOSIS — I50.22 CHRONIC SYSTOLIC HEART FAILURE (MULTI): ICD-10-CM

## 2024-06-04 RX ORDER — CARVEDILOL 12.5 MG/1
12.5 TABLET ORAL
COMMUNITY
End: 2024-06-06 | Stop reason: SDUPTHER

## 2024-06-04 RX ORDER — SACUBITRIL AND VALSARTAN 49; 51 MG/1; MG/1
1 TABLET, FILM COATED ORAL 2 TIMES DAILY
Qty: 180 TABLET | Refills: 3 | Status: SHIPPED | OUTPATIENT
Start: 2024-06-04 | End: 2025-06-04

## 2024-06-04 RX ORDER — ATORVASTATIN CALCIUM 80 MG/1
80 TABLET, FILM COATED ORAL NIGHTLY
Qty: 90 TABLET | Refills: 3 | Status: SHIPPED | OUTPATIENT
Start: 2024-06-04 | End: 2025-06-04

## 2024-06-05 ENCOUNTER — CLINICAL SUPPORT (OUTPATIENT)
Dept: CARDIAC REHAB | Facility: CLINIC | Age: 79
End: 2024-06-05
Payer: MEDICARE

## 2024-06-05 DIAGNOSIS — Z95.5 S/P CORONARY ARTERY STENT PLACEMENT: ICD-10-CM

## 2024-06-05 PROCEDURE — 93798 PHYS/QHP OP CAR RHAB W/ECG: CPT

## 2024-06-06 DIAGNOSIS — I10 HYPERTENSION, UNSPECIFIED TYPE: Primary | ICD-10-CM

## 2024-06-06 RX ORDER — CARVEDILOL 12.5 MG/1
12.5 TABLET ORAL
Qty: 90 TABLET | Refills: 3 | Status: SHIPPED | OUTPATIENT
Start: 2024-06-06

## 2024-06-06 NOTE — TELEPHONE ENCOUNTER
Pharmacy has no record of this prescription on file.  The wife called and asked  if it can be reordered?

## 2024-06-07 ENCOUNTER — CLINICAL SUPPORT (OUTPATIENT)
Dept: CARDIAC REHAB | Facility: CLINIC | Age: 79
End: 2024-06-07
Payer: MEDICARE

## 2024-06-07 DIAGNOSIS — Z95.5 S/P CORONARY ARTERY STENT PLACEMENT: ICD-10-CM

## 2024-06-07 PROCEDURE — 93798 PHYS/QHP OP CAR RHAB W/ECG: CPT | Performed by: INTERNAL MEDICINE

## 2024-06-10 ENCOUNTER — APPOINTMENT (OUTPATIENT)
Dept: CARDIAC REHAB | Facility: CLINIC | Age: 79
End: 2024-06-10
Payer: MEDICARE

## 2024-06-12 ENCOUNTER — APPOINTMENT (OUTPATIENT)
Dept: CARDIAC REHAB | Facility: CLINIC | Age: 79
End: 2024-06-12
Payer: MEDICARE

## 2024-06-14 ENCOUNTER — APPOINTMENT (OUTPATIENT)
Dept: CARDIAC REHAB | Facility: CLINIC | Age: 79
End: 2024-06-14
Payer: MEDICARE

## 2024-06-17 ENCOUNTER — CLINICAL SUPPORT (OUTPATIENT)
Dept: CARDIAC REHAB | Facility: CLINIC | Age: 79
End: 2024-06-17
Payer: MEDICARE

## 2024-06-17 DIAGNOSIS — Z95.5 S/P CORONARY ARTERY STENT PLACEMENT: ICD-10-CM

## 2024-06-17 PROCEDURE — 93798 PHYS/QHP OP CAR RHAB W/ECG: CPT

## 2024-06-19 ENCOUNTER — CLINICAL SUPPORT (OUTPATIENT)
Dept: CARDIAC REHAB | Facility: CLINIC | Age: 79
End: 2024-06-19
Payer: MEDICARE

## 2024-06-19 ENCOUNTER — TELEPHONE (OUTPATIENT)
Dept: CARDIOLOGY | Facility: CLINIC | Age: 79
End: 2024-06-19

## 2024-06-19 DIAGNOSIS — Z95.5 S/P CORONARY ARTERY STENT PLACEMENT: ICD-10-CM

## 2024-06-19 PROCEDURE — 93798 PHYS/QHP OP CAR RHAB W/ECG: CPT

## 2024-06-19 NOTE — PROGRESS NOTES
Patient has been having diarrhea since starting all of his new medications.    I spoke to his wife she tells me that they switched his Jardiance to Synjardy because his sugars were high on the Jardiance alone.  I told her that the metformin could be causing the diarrhea even though he had been on this medicine previously.  It may be combination effect with other medicines.    I told her to stop the Synjardy and go back on Jardiance.  If this solves the diarrhea then no further action is needed and I would ask him to speak to his primary care physician.    If the diarrhea continues then I asked him to stop the atorvastatin for a few days and restart it if no change, then try the Entresto for a few days, and then the Coreg for a few days.  If he stops any 1 of these medicines and there is no change then he is instructed to get back on that medicine and try the next 1.    I asked him to call later next week with an update.    In the event that none of these medicines are causing the diarrhea then we can switch his Brilinta to clopidogrel.  I explained that he cannot stop the Brilinta.  We would have to switch him immediately over to clopidogrel if we make that change.

## 2024-06-19 NOTE — TELEPHONE ENCOUNTER
"Nadine calls today stating that Phi had an MI 7 weeks ago and you assured them they could talk with you if any issues arised.  While the SOB and fatigue has \"quieted down\"  he has \"very distressing diarrhea\" pretty much daily.  His quality of life is affected and he is till working full time so this is a problem.      Please advise / thanks   "

## 2024-06-19 NOTE — PROGRESS NOTES
INDIVIDUAL CARDIAC TREATMENT PLAN-30 DAY REASSESSMENT     Name: Phi Hoffman   Today's Date: 24   : 1945    Primary Provider: FELIBERTO Castillo  MRN: [id number]    Referring Physician: RAMON Salmon     Diagnosis: STEMI/ Stents x 2 LAD    Onset Date: 24      Risk Stratification: High      NUTRITION REASSESSMENT  Lipids:   Lipid Lab Date: 23  Total Chol: 292  HDL: 45.1  LDL: 210  Tri  Cholesterol Med: Atorvastatin 80mg @ HS    Diabetes: Yes  HgbA1c: 7.9%  Date Checked: 24  Monitors glucose at home: yes  Fasting Blood Sugar Range:156-173  Frequency: 3 times per week  Hypoglycemic Episode: Denies    Weight Management  Weight: 209 lbs  Height: 68 inches  BMI:  32.08  Pre Body Composition: 27%  Post Body Composition:  Pre Waist Circumference: 45 inches  Post Waist Circumference:  Current Diet: Heart Healthy; Wife makes sure he follows cardiac diet well.  Barriers to dietary change: Denies    Initial Dietary Assessment Score: 74/96  Discharge Dietary Assessment Score:       NUTRITION PLAN  Nutrition Goals:   1. Improve Picture Your Plate assessment results by discharge. Overall heart healthy diet. Will not increase bean intake as suggested by dietician. Pt's wife feels it causes inflammation.  2. Make changes to diet to include heart healthy options while in the program. Pts wife is extremely health focused. Gets naturally fed meat through [employer]. States no white/processed food in house. Reports he does not deviate from diet when out of home.     Nutrition Intervention/Education:   *Reassess Picture Your Plate Assessment at discharge.  *Reviewed Picture Your Plate goals and tip sheets: Beans   *Encourage attendance to small group nutrition lecture directed by dietician. Attended 6/3  *Measure body composition and waist circumference at initial and discharge of program. Goal to have pt lose 1 inch off of waist.   *Notify patient of dietary education topics and handouts.       OTHER CORE  "COMPONENTS/ RISK FACTORS REASSESSMENT  Medication compliance: good compliance  Using pill box: Yes  Carries medication list: Yes    Blood Pressure Management:  History of High BP: Yes; well controlled on medications  Resting BP: 136/70    Tobacco: FORMER  Form of tobacco: Cigarettes; 1 PPD x 20 yrs  Quit Date:  1978  Anyone in the house smoke: No    Initial Knowledge Test Score: 10/15  Discharge Knowledge Test Score:    OTHER CORE COMPONENTS/ RISK FACTOR PLAN   Other Core components/Risk Factor Goals:                                                                                                                                                    1.  Increase knowledge test score by discharge. Attending education.   2.  Decrease dyspnea on exertion while in the program. Remains present. Encouraged to discuss with MD.     Other Components/ Risk Factors Intervention/Education:  *Will continue to monitor HR, BP, dyspnea and arrhythmias each session.   *Will meet with patient to discuss goals & progress.  *Encouraged review of education materials.       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; work related, owns business  Using stress management skills: Yes; keeps things in perspective; does art and exercise but keeps very focused and has purpose with all activities; does not see them as \"relaxing.\"  HX of anxiety: Yes; remote; has learned coping techniques  HX of depression: No  Patient questioned regarding any new stress, depression and anxiety symptoms: No    Family/Support System: Wife, Rastafarian  Seeing mental health provider: No  Psychosocial medications:    Initial PHQ-9 score: 10 (Moderate Risk)  Discharge PHQ-9 score:   Was PHQ-9 faxed to provider: No  Date faxed:    Quality of Life Survey: SF-36 Pre Post   Physical Component Score 55.86 TBD   Mental Component Score 30.72 TBD     Stages of change:  Preparation    PSYCHOSOCIAL PLAN  Psychosocial Goals:  1. Improve stage of change from " "\"Preparation\" to \"Action\" while in the program. Shows healthy diet, engaging in home exercise.   2. Improve PHQ-9 category classification from \"Moderate Risk\" to \"Mild Risk\" while in the program. Pt has good attitude, pleasant.     Psychosocial Interventions/ Education:  *Aim to progress 1 MET every 6 weeks per reported dyspnea and pain  *Incorporate resistance training for muscular endurance and strength.  *Will introduce new equipment as appropriate. Alternating upper and lower body.       EXERCISE REASSESSMENT  Home Exercise: Yes  Frequency: 6 days/week for 45 minutes  Mode:  [employer]; 15 minutes on treadmill plus resistance exercises (alternates upper and lower body days) ; does moderate intensity    EXERCISE PLAN  Exercise Goals:   1. Goal of 5.6 METs by discharge. Currently working at peak of 4 METs.  2. Have a plan in place for continued exercise after the program by discharge. Currently exercising 3 days a week at [employer]    Exercise Prescription:   Based on 12 Minute Walk Test  Frequency: 3 days per week  Duration (total aerobic min.): 30 minutes  Intensity RPE: 11-14  Target HR: Rest +30; (age-predicted: )  MET Level Range: 2.4-3.7     Modality METS Load  Duration   1 Warm Up    05:00   2 Upright Bike 4 60 tejada Level 3 06:00   3 Arms Airdyne 2.4 0.9 load  06:00   4 Treadmill 4 2.8 mph 2% 06:00   5 NuStep 2.9 74 tejada Level 5 06:00   6 Recumbent Bike 3.7 52 tejada Level 2 06:00   7 Cool Down     05:00     Exercise Intervention/Education:   *Aim to progress 1 MET every 6 Weeks or as tolerated.  *Incorporate resistance training for muscular endurance and strength.      Date of first exercise session: Pending MD signature      LEARNING ASSESSMENT & BARRIERS  Readiness to Learn: Eager to learn  Barriers: has had cataract surgery for full vision; does not use reading glasses; mild [place]  Comments:    FALL RISK  moderate  Comments:  Patient has steady gait on treadmill but does have neuropathy in " feet.    INDIVIDUAL PATIENT GOALS:   Learn breathing techniques to improve shortness of breath while in the program.    2.    Return to [facility] routine at [facility] by discharge.    MEDICATIONS  Aspirin 81mg daily; Atorvastatin 80mg daily; Cardivedilol 12.5mg BID; Ticagrelor 90mg BID; Entresto 49-51mg BID; Ascorbic Acid 500mg daily; Empagliflozin 25mg daily; Multivitamin 1 tab QID; Probiotic  1 tab daily; CoQ10 100mg daily; Zinc 1 tab daily; Homocysteine support 1 tab daily; Vitamin D3+K2 1 tab daily; Ubiquinol 100mg daily; Focus IQ 1 tab daily; N-Acetyl L-Cysteine 1 tab daily; Whole B complex 1 tab daily; Juice plus vegetable+sindhu blend 1 tab daily; Cyruta plus 1 tab daily; Cardio plus 1 tab daily; Glutathione 100mg daily; Turmeric carcumin 1 tab daily; Ginkgo biloba 120mg daily    STAFF COMMENTS:   Patient has completed 9/36 sessions. Doing well. Has extensive cardiac history and has done cardiac rehab in the past. Pt's wife is extremely healthy focused. Reports eating a healthy diet. Now exercising 3 days per week outside of cardiac rehab. Tolerating prescribed workloads well. Does not report issues during exercise but states he has dyspnea. Encouraged to discuss with MD.

## 2024-06-21 ENCOUNTER — CLINICAL SUPPORT (OUTPATIENT)
Dept: CARDIAC REHAB | Facility: CLINIC | Age: 79
End: 2024-06-21
Payer: MEDICARE

## 2024-06-21 DIAGNOSIS — Z95.5 S/P CORONARY ARTERY STENT PLACEMENT: ICD-10-CM

## 2024-06-21 PROCEDURE — 93798 PHYS/QHP OP CAR RHAB W/ECG: CPT | Performed by: INTERNAL MEDICINE

## 2024-06-24 ENCOUNTER — CLINICAL SUPPORT (OUTPATIENT)
Dept: CARDIAC REHAB | Facility: CLINIC | Age: 79
End: 2024-06-24
Payer: MEDICARE

## 2024-06-24 DIAGNOSIS — Z95.5 S/P CORONARY ARTERY STENT PLACEMENT: ICD-10-CM

## 2024-06-24 PROCEDURE — 93798 PHYS/QHP OP CAR RHAB W/ECG: CPT

## 2024-06-26 ENCOUNTER — CLINICAL SUPPORT (OUTPATIENT)
Dept: CARDIAC REHAB | Facility: CLINIC | Age: 79
End: 2024-06-26
Payer: MEDICARE

## 2024-06-26 DIAGNOSIS — Z95.5 S/P CORONARY ARTERY STENT PLACEMENT: ICD-10-CM

## 2024-06-26 PROCEDURE — 93798 PHYS/QHP OP CAR RHAB W/ECG: CPT

## 2024-06-28 ENCOUNTER — CLINICAL SUPPORT (OUTPATIENT)
Dept: CARDIAC REHAB | Facility: CLINIC | Age: 79
End: 2024-06-28
Payer: MEDICARE

## 2024-06-28 ENCOUNTER — LAB (OUTPATIENT)
Dept: LAB | Facility: LAB | Age: 79
End: 2024-06-28
Payer: MEDICARE

## 2024-06-28 DIAGNOSIS — E72.19: ICD-10-CM

## 2024-06-28 DIAGNOSIS — R73.01 IMPAIRED FASTING GLUCOSE: ICD-10-CM

## 2024-06-28 DIAGNOSIS — R79.82 ELEVATED C-REACTIVE PROTEIN (CRP): ICD-10-CM

## 2024-06-28 DIAGNOSIS — R03.0 ELEVATED BLOOD-PRESSURE READING, WITHOUT DIAGNOSIS OF HYPERTENSION: ICD-10-CM

## 2024-06-28 DIAGNOSIS — Z79.890 HORMONE REPLACEMENT THERAPY: ICD-10-CM

## 2024-06-28 DIAGNOSIS — R79.89 OTHER SPECIFIED ABNORMAL FINDINGS OF BLOOD CHEMISTRY: ICD-10-CM

## 2024-06-28 DIAGNOSIS — E55.9 VITAMIN D DEFICIENCY, UNSPECIFIED: ICD-10-CM

## 2024-06-28 DIAGNOSIS — D51.9 VITAMIN B12 DEFICIENCY ANEMIA, UNSPECIFIED: ICD-10-CM

## 2024-06-28 DIAGNOSIS — E28.0 ESTROGEN EXCESS: ICD-10-CM

## 2024-06-28 DIAGNOSIS — Z95.5 S/P CORONARY ARTERY STENT PLACEMENT: ICD-10-CM

## 2024-06-28 DIAGNOSIS — E78.5 HYPERLIPIDEMIA, UNSPECIFIED: ICD-10-CM

## 2024-06-28 DIAGNOSIS — E34.9 ENDOCRINE DISORDER, UNSPECIFIED: ICD-10-CM

## 2024-06-28 DIAGNOSIS — E07.9 DISORDER OF THYROID, UNSPECIFIED: ICD-10-CM

## 2024-06-28 DIAGNOSIS — E56.9 VITAMIN DEFICIENCY, UNSPECIFIED: ICD-10-CM

## 2024-06-28 LAB — FERRITIN SERPL-MCNC: 147 NG/ML (ref 20–300)

## 2024-06-28 PROCEDURE — 82728 ASSAY OF FERRITIN: CPT

## 2024-06-28 PROCEDURE — 93798 PHYS/QHP OP CAR RHAB W/ECG: CPT

## 2024-07-01 ENCOUNTER — CLINICAL SUPPORT (OUTPATIENT)
Dept: CARDIAC REHAB | Facility: CLINIC | Age: 79
End: 2024-07-01
Payer: MEDICARE

## 2024-07-01 DIAGNOSIS — Z95.5 S/P CORONARY ARTERY STENT PLACEMENT: ICD-10-CM

## 2024-07-01 LAB
COPPER SERPL-MCNC: 87 UG/DL (ref 70–140)
SELENIUM SERPL-MCNC: 127.7 UG/L (ref 23–190)
ZINC SERPL-MCNC: 76.8 UG/DL (ref 60–120)

## 2024-07-01 PROCEDURE — 93798 PHYS/QHP OP CAR RHAB W/ECG: CPT | Performed by: INTERNAL MEDICINE

## 2024-07-03 ENCOUNTER — CLINICAL SUPPORT (OUTPATIENT)
Dept: CARDIAC REHAB | Facility: CLINIC | Age: 79
End: 2024-07-03
Payer: MEDICARE

## 2024-07-03 DIAGNOSIS — Z95.5 S/P CORONARY ARTERY STENT PLACEMENT: ICD-10-CM

## 2024-07-03 PROCEDURE — 93798 PHYS/QHP OP CAR RHAB W/ECG: CPT

## 2024-07-05 ENCOUNTER — CLINICAL SUPPORT (OUTPATIENT)
Dept: CARDIAC REHAB | Facility: CLINIC | Age: 79
End: 2024-07-05
Payer: MEDICARE

## 2024-07-05 DIAGNOSIS — Z95.5 S/P CORONARY ARTERY STENT PLACEMENT: ICD-10-CM

## 2024-07-05 PROCEDURE — 93798 PHYS/QHP OP CAR RHAB W/ECG: CPT

## 2024-07-08 ENCOUNTER — CLINICAL SUPPORT (OUTPATIENT)
Dept: CARDIAC REHAB | Facility: CLINIC | Age: 79
End: 2024-07-08
Payer: MEDICARE

## 2024-07-08 DIAGNOSIS — Z95.5 S/P CORONARY ARTERY STENT PLACEMENT: ICD-10-CM

## 2024-07-08 PROCEDURE — 93798 PHYS/QHP OP CAR RHAB W/ECG: CPT | Performed by: INTERNAL MEDICINE

## 2024-07-10 ENCOUNTER — CLINICAL SUPPORT (OUTPATIENT)
Dept: CARDIAC REHAB | Facility: CLINIC | Age: 79
End: 2024-07-10
Payer: MEDICARE

## 2024-07-10 ENCOUNTER — HOSPITAL ENCOUNTER (OUTPATIENT)
Dept: RADIOLOGY | Facility: CLINIC | Age: 79
Discharge: HOME | End: 2024-07-10
Payer: MEDICARE

## 2024-07-10 DIAGNOSIS — R41.89 OTHER SYMPTOMS AND SIGNS INVOLVING COGNITIVE FUNCTIONS AND AWARENESS: ICD-10-CM

## 2024-07-10 DIAGNOSIS — R47.9 UNSPECIFIED SPEECH DISTURBANCES: ICD-10-CM

## 2024-07-10 DIAGNOSIS — Z95.5 S/P CORONARY ARTERY STENT PLACEMENT: ICD-10-CM

## 2024-07-10 DIAGNOSIS — R26.2 DIFFICULTY IN WALKING, NOT ELSEWHERE CLASSIFIED: ICD-10-CM

## 2024-07-10 PROCEDURE — 70553 MRI BRAIN STEM W/O & W/DYE: CPT | Performed by: RADIOLOGY

## 2024-07-10 PROCEDURE — 2500000004 HC RX 250 GENERAL PHARMACY W/ HCPCS (ALT 636 FOR OP/ED)

## 2024-07-10 PROCEDURE — A9575 INJ GADOTERATE MEGLUMI 0.1ML: HCPCS

## 2024-07-10 PROCEDURE — 70553 MRI BRAIN STEM W/O & W/DYE: CPT

## 2024-07-10 PROCEDURE — 93798 PHYS/QHP OP CAR RHAB W/ECG: CPT

## 2024-07-10 RX ORDER — GADOTERATE MEGLUMINE 376.9 MG/ML
0.2 INJECTION INTRAVENOUS
Status: COMPLETED | OUTPATIENT
Start: 2024-07-10 | End: 2024-07-10

## 2024-07-11 DIAGNOSIS — E34.9 ENDOCRINE DISORDER, UNSPECIFIED: ICD-10-CM

## 2024-07-11 DIAGNOSIS — E72.19: ICD-10-CM

## 2024-07-11 DIAGNOSIS — R79.89 OTHER SPECIFIED ABNORMAL FINDINGS OF BLOOD CHEMISTRY: Primary | ICD-10-CM

## 2024-07-11 DIAGNOSIS — R73.01 IMPAIRED FASTING GLUCOSE: ICD-10-CM

## 2024-07-11 DIAGNOSIS — R79.82 ELEVATED C-REACTIVE PROTEIN (CRP): Primary | ICD-10-CM

## 2024-07-11 DIAGNOSIS — Z51.81 ENCOUNTER FOR THERAPEUTIC DRUG LEVEL MONITORING: ICD-10-CM

## 2024-07-11 DIAGNOSIS — E78.5 HYPERLIPIDEMIA, UNSPECIFIED: ICD-10-CM

## 2024-07-11 DIAGNOSIS — D51.9 VITAMIN B12 DEFICIENCY ANEMIA, UNSPECIFIED: ICD-10-CM

## 2024-07-11 DIAGNOSIS — Z12.5 ENCOUNTER FOR SCREENING FOR MALIGNANT NEOPLASM OF PROSTATE: ICD-10-CM

## 2024-07-11 DIAGNOSIS — E55.9 VITAMIN D DEFICIENCY, UNSPECIFIED: ICD-10-CM

## 2024-07-11 DIAGNOSIS — E28.0 ESTROGEN EXCESS: ICD-10-CM

## 2024-07-11 DIAGNOSIS — Z79.82 LONG TERM (CURRENT) USE OF ASPIRIN: ICD-10-CM

## 2024-07-11 DIAGNOSIS — R53.83 OTHER FATIGUE: ICD-10-CM

## 2024-07-11 DIAGNOSIS — R03.0 ELEVATED BLOOD-PRESSURE READING, WITHOUT DIAGNOSIS OF HYPERTENSION: ICD-10-CM

## 2024-07-11 DIAGNOSIS — E07.9 DISORDER OF THYROID, UNSPECIFIED: ICD-10-CM

## 2024-07-11 DIAGNOSIS — E11.9 TYPE 2 DIABETES MELLITUS WITHOUT COMPLICATIONS (MULTI): ICD-10-CM

## 2024-07-11 DIAGNOSIS — M62.50 MUSCLE WASTING AND ATROPHY, NOT ELSEWHERE CLASSIFIED, UNSPECIFIED SITE: ICD-10-CM

## 2024-07-11 DIAGNOSIS — E56.9 VITAMIN DEFICIENCY, UNSPECIFIED: ICD-10-CM

## 2024-07-11 DIAGNOSIS — R79.82 ELEVATED C-REACTIVE PROTEIN (CRP): ICD-10-CM

## 2024-07-11 DIAGNOSIS — R63.5 ABNORMAL WEIGHT GAIN: ICD-10-CM

## 2024-07-12 ENCOUNTER — LAB (OUTPATIENT)
Dept: LAB | Facility: LAB | Age: 79
End: 2024-07-12
Payer: MEDICARE

## 2024-07-12 ENCOUNTER — CLINICAL SUPPORT (OUTPATIENT)
Dept: CARDIAC REHAB | Facility: CLINIC | Age: 79
End: 2024-07-12
Payer: MEDICARE

## 2024-07-12 DIAGNOSIS — R79.89 OTHER SPECIFIED ABNORMAL FINDINGS OF BLOOD CHEMISTRY: ICD-10-CM

## 2024-07-12 DIAGNOSIS — E34.9 ENDOCRINE DISORDER, UNSPECIFIED: ICD-10-CM

## 2024-07-12 DIAGNOSIS — Z51.81 ENCOUNTER FOR THERAPEUTIC DRUG LEVEL MONITORING: ICD-10-CM

## 2024-07-12 DIAGNOSIS — M62.50 MUSCLE WASTING AND ATROPHY, NOT ELSEWHERE CLASSIFIED, UNSPECIFIED SITE: ICD-10-CM

## 2024-07-12 DIAGNOSIS — E28.0 ESTROGEN EXCESS: ICD-10-CM

## 2024-07-12 DIAGNOSIS — R73.01 IMPAIRED FASTING GLUCOSE: ICD-10-CM

## 2024-07-12 DIAGNOSIS — Z79.82 LONG TERM (CURRENT) USE OF ASPIRIN: ICD-10-CM

## 2024-07-12 DIAGNOSIS — E72.19: ICD-10-CM

## 2024-07-12 DIAGNOSIS — R63.5 ABNORMAL WEIGHT GAIN: ICD-10-CM

## 2024-07-12 DIAGNOSIS — R53.83 OTHER FATIGUE: ICD-10-CM

## 2024-07-12 DIAGNOSIS — E55.9 VITAMIN D DEFICIENCY, UNSPECIFIED: ICD-10-CM

## 2024-07-12 DIAGNOSIS — E78.5 HYPERLIPIDEMIA, UNSPECIFIED: ICD-10-CM

## 2024-07-12 DIAGNOSIS — E07.9 DISORDER OF THYROID, UNSPECIFIED: ICD-10-CM

## 2024-07-12 DIAGNOSIS — Z95.5 S/P CORONARY ARTERY STENT PLACEMENT: ICD-10-CM

## 2024-07-12 DIAGNOSIS — R79.82 ELEVATED C-REACTIVE PROTEIN (CRP): ICD-10-CM

## 2024-07-12 DIAGNOSIS — D51.9 VITAMIN B12 DEFICIENCY ANEMIA, UNSPECIFIED: ICD-10-CM

## 2024-07-12 DIAGNOSIS — E56.9 VITAMIN DEFICIENCY, UNSPECIFIED: ICD-10-CM

## 2024-07-12 DIAGNOSIS — E11.9 TYPE 2 DIABETES MELLITUS WITHOUT COMPLICATIONS (MULTI): ICD-10-CM

## 2024-07-12 DIAGNOSIS — R03.0 ELEVATED BLOOD-PRESSURE READING, WITHOUT DIAGNOSIS OF HYPERTENSION: ICD-10-CM

## 2024-07-12 DIAGNOSIS — Z12.5 ENCOUNTER FOR SCREENING FOR MALIGNANT NEOPLASM OF PROSTATE: ICD-10-CM

## 2024-07-12 LAB
25(OH)D3 SERPL-MCNC: 64 NG/ML (ref 30–100)
ALBUMIN SERPL BCP-MCNC: 4.2 G/DL (ref 3.4–5)
ALP SERPL-CCNC: 55 U/L (ref 33–136)
ALT SERPL W P-5'-P-CCNC: 13 U/L (ref 10–52)
ANION GAP SERPL CALC-SCNC: 14 MMOL/L (ref 10–20)
AST SERPL W P-5'-P-CCNC: 12 U/L (ref 9–39)
BASOPHILS # BLD AUTO: 0.07 X10*3/UL (ref 0–0.1)
BASOPHILS NFR BLD AUTO: 1.6 %
BILIRUB SERPL-MCNC: 0.6 MG/DL (ref 0–1.2)
BUN SERPL-MCNC: 24 MG/DL (ref 6–23)
CALCIUM SERPL-MCNC: 9.1 MG/DL (ref 8.6–10.3)
CHLORIDE SERPL-SCNC: 108 MMOL/L (ref 98–107)
CHOLEST SERPL-MCNC: 241 MG/DL (ref 0–199)
CHOLESTEROL/HDL RATIO: 5
CO2 SERPL-SCNC: 21 MMOL/L (ref 21–32)
CORTIS AM PEAK SERPL-MSCNC: 11.5 UG/DL (ref 5–20)
CREAT SERPL-MCNC: 1 MG/DL (ref 0.5–1.3)
CRP SERPL HS-MCNC: 1.4 MG/L
DHEA-S SERPL-MCNC: 41 UG/DL (ref 28–290)
EGFRCR SERPLBLD CKD-EPI 2021: 76 ML/MIN/1.73M*2
EOSINOPHIL # BLD AUTO: 0.2 X10*3/UL (ref 0–0.4)
EOSINOPHIL NFR BLD AUTO: 4.6 %
ERYTHROCYTE [DISTWIDTH] IN BLOOD BY AUTOMATED COUNT: 14 % (ref 11.5–14.5)
EST. AVERAGE GLUCOSE BLD GHB EST-MCNC: 186 MG/DL
ESTRADIOL SERPL-MCNC: 22 PG/ML
FERRITIN SERPL-MCNC: 169 NG/ML (ref 20–300)
FOLATE SERPL-MCNC: >22.3 NG/ML
GLUCOSE SERPL-MCNC: 170 MG/DL (ref 74–99)
HBA1C MFR BLD: 8.1 %
HCT VFR BLD AUTO: 40.8 % (ref 41–52)
HDLC SERPL-MCNC: 48.2 MG/DL
HGB BLD-MCNC: 13.7 G/DL (ref 13.5–17.5)
IMM GRANULOCYTES # BLD AUTO: 0.01 X10*3/UL (ref 0–0.5)
IMM GRANULOCYTES NFR BLD AUTO: 0.2 % (ref 0–0.9)
INSULIN P FAST SERPL-ACNC: 7 UIU/ML (ref 3–25)
LDLC SERPL CALC-MCNC: 159 MG/DL
LYMPHOCYTES # BLD AUTO: 1.4 X10*3/UL (ref 0.8–3)
LYMPHOCYTES NFR BLD AUTO: 32 %
MAGNESIUM SERPL-MCNC: 1.98 MG/DL (ref 1.6–2.4)
MCH RBC QN AUTO: 31 PG (ref 26–34)
MCHC RBC AUTO-ENTMCNC: 33.6 G/DL (ref 32–36)
MCV RBC AUTO: 92 FL (ref 80–100)
MONOCYTES # BLD AUTO: 0.48 X10*3/UL (ref 0.05–0.8)
MONOCYTES NFR BLD AUTO: 11 %
NEUTROPHILS # BLD AUTO: 2.21 X10*3/UL (ref 1.6–5.5)
NEUTROPHILS NFR BLD AUTO: 50.6 %
NON HDL CHOLESTEROL: 193 MG/DL (ref 0–149)
NRBC BLD-RTO: 0 /100 WBCS (ref 0–0)
PLATELET # BLD AUTO: 236 X10*3/UL (ref 150–450)
POTASSIUM SERPL-SCNC: 4.5 MMOL/L (ref 3.5–5.3)
PROT SERPL-MCNC: 6.8 G/DL (ref 6.4–8.2)
PSA SERPL-MCNC: 1.91 NG/ML
RBC # BLD AUTO: 4.42 X10*6/UL (ref 4.5–5.9)
SODIUM SERPL-SCNC: 138 MMOL/L (ref 136–145)
T3FREE SERPL-MCNC: 3.2 PG/ML (ref 2.3–4.2)
T4 FREE SERPL-MCNC: 0.93 NG/DL (ref 0.61–1.12)
TRIGL SERPL-MCNC: 168 MG/DL (ref 0–149)
TSH SERPL-ACNC: 2.77 MIU/L (ref 0.44–3.98)
VIT B12 SERPL-MCNC: 869 PG/ML (ref 211–911)
VLDL: 34 MG/DL (ref 0–40)
WBC # BLD AUTO: 4.4 X10*3/UL (ref 4.4–11.3)

## 2024-07-12 PROCEDURE — 85025 COMPLETE CBC W/AUTO DIFF WBC: CPT

## 2024-07-12 PROCEDURE — G0103 PSA SCREENING: HCPCS

## 2024-07-12 PROCEDURE — 82670 ASSAY OF TOTAL ESTRADIOL: CPT

## 2024-07-12 PROCEDURE — 82642 DIHYDROTESTOSTERONE: CPT

## 2024-07-12 PROCEDURE — 83525 ASSAY OF INSULIN: CPT

## 2024-07-12 PROCEDURE — 82180 ASSAY OF ASCORBIC ACID: CPT

## 2024-07-12 PROCEDURE — 82306 VITAMIN D 25 HYDROXY: CPT

## 2024-07-12 PROCEDURE — 82542 COL CHROMOTOGRAPHY QUAL/QUAN: CPT

## 2024-07-12 PROCEDURE — 86141 C-REACTIVE PROTEIN HS: CPT

## 2024-07-12 PROCEDURE — 93798 PHYS/QHP OP CAR RHAB W/ECG: CPT

## 2024-07-12 PROCEDURE — 36415 COLL VENOUS BLD VENIPUNCTURE: CPT

## 2024-07-12 PROCEDURE — 82533 TOTAL CORTISOL: CPT

## 2024-07-12 PROCEDURE — 82672 ASSAY OF ESTROGEN: CPT

## 2024-07-12 PROCEDURE — 84402 ASSAY OF FREE TESTOSTERONE: CPT

## 2024-07-12 PROCEDURE — 84481 FREE ASSAY (FT-3): CPT

## 2024-07-12 PROCEDURE — 84439 ASSAY OF FREE THYROXINE: CPT

## 2024-07-12 PROCEDURE — 82728 ASSAY OF FERRITIN: CPT

## 2024-07-12 PROCEDURE — 83735 ASSAY OF MAGNESIUM: CPT

## 2024-07-12 PROCEDURE — 83090 ASSAY OF HOMOCYSTEINE: CPT

## 2024-07-12 PROCEDURE — 82607 VITAMIN B-12: CPT

## 2024-07-12 PROCEDURE — 80061 LIPID PANEL: CPT

## 2024-07-12 PROCEDURE — 84443 ASSAY THYROID STIM HORMONE: CPT

## 2024-07-12 PROCEDURE — 82746 ASSAY OF FOLIC ACID SERUM: CPT

## 2024-07-12 PROCEDURE — 80053 COMPREHEN METABOLIC PANEL: CPT

## 2024-07-12 PROCEDURE — 83036 HEMOGLOBIN GLYCOSYLATED A1C: CPT

## 2024-07-12 PROCEDURE — 82626 DEHYDROEPIANDROSTERONE: CPT

## 2024-07-12 PROCEDURE — 84305 ASSAY OF SOMATOMEDIN: CPT

## 2024-07-12 PROCEDURE — 82627 DEHYDROEPIANDROSTERONE: CPT

## 2024-07-13 LAB — HCYS SERPL-SCNC: 11.97 UMOL/L (ref 5–13.9)

## 2024-07-15 ENCOUNTER — CLINICAL SUPPORT (OUTPATIENT)
Dept: CARDIAC REHAB | Facility: CLINIC | Age: 79
End: 2024-07-15
Payer: MEDICARE

## 2024-07-15 DIAGNOSIS — Z95.5 S/P CORONARY ARTERY STENT PLACEMENT: ICD-10-CM

## 2024-07-15 LAB
IGF-I SERPL-MCNC: 102 NG/ML (ref 17–180)
IGF-I Z-SCORE SERPL: 0.4

## 2024-07-15 PROCEDURE — 93798 PHYS/QHP OP CAR RHAB W/ECG: CPT

## 2024-07-16 LAB — ANDROSTANOLONE SERPL-MCNC: 240.2 PG/ML (ref 106–719)

## 2024-07-17 ENCOUNTER — CLINICAL SUPPORT (OUTPATIENT)
Dept: CARDIAC REHAB | Facility: CLINIC | Age: 79
End: 2024-07-17
Payer: MEDICARE

## 2024-07-17 DIAGNOSIS — Z95.5 S/P CORONARY ARTERY STENT PLACEMENT: ICD-10-CM

## 2024-07-17 LAB
DHEA SERPL-MCNC: 0.74 NG/ML (ref 0.63–4.7)
VIT C SERPL-MCNC: 83 UMOL/L (ref 23–114)

## 2024-07-17 PROCEDURE — 93798 PHYS/QHP OP CAR RHAB W/ECG: CPT

## 2024-07-17 NOTE — PROGRESS NOTES
INDIVIDUAL CARDIAC TREATMENT PLAN-60 DAY REASSESSMENT     Name: Phi Hoffman   Today's Date: 24   : 1945    Primary Provider: FELIBERTO Castillo  MRN: [id number]    Referring Physician: RAMON Salmon     Diagnosis: STEMI/ Stents x 2 LAD    Onset Date: 24      Risk Stratification: High      NUTRITION REASSESSMENT  Lipids:   Lipid Lab Date: 24  Total Chol: 241  HDL: 48.2  LDL: 159  Tri  Cholesterol Med: Atorvastatin 80mg @ HS    Diabetes: Yes  HgbA1c: 8.1%  Date Checked: 24  Monitors glucose at home: yes  Fasting Blood Sugar Range:160-190  Frequency: 3 times per week  Hypoglycemic Episode: Denies    Weight Management  Weight: 206 lbs  Height: 68 inches  BMI:  32.08  Pre Body Composition: 27%  Post Body Composition:  Pre Waist Circumference: 45 inches  Post Waist Circumference:  Current Diet: Reports heart healthy diet  Barriers to dietary change: Denies    Initial Dietary Assessment Score: 74/96  Discharge Dietary Assessment Score: Will reassess at discharge.       NUTRITION PLAN  Nutrition Goals:   1. Improve Picture Your Plate assessment results by discharge. Overall heart healthy diet. Will not increase bean intake as suggested by dietician. Pt's wife feels it causes inflammation. Pts hemoglobin A1C has increased.   2. Make changes to diet to include heart healthy options while in the program. Pts wife is extremely health focused. Gets naturally fed meat through [employer]. States no white/processed food in house. Reports he does not deviate from diet when out of home. Does not translate into results per blood sugar and weight.     Nutrition Intervention/Education:   *Reassess Picture Your Plate Assessment at discharge.  *Encourage attendance to small group nutrition lecture directed by dietician. Attended 6/3  *Measure body composition and waist circumference at initial and discharge of program. Goal to have pt lose 1 inch off of waist.   *Notify patient of dietary education topics and  "handouts.     Education: Building a Healthy Plate, Dietary Fats, Sodium, BMI & Body Composition      OTHER CORE COMPONENTS/ RISK FACTORS REASSESSMENT  Medication compliance: good compliance  Using pill box: Yes  Carries medication list: Yes    Blood Pressure Management:  History of High BP: Yes  Resting BP: 128/60    Tobacco: FORMER  Form of tobacco: Cigarettes; 1 PPD x 20 yrs  Quit Date:  1978  Anyone in the house smoke: No    Initial Knowledge Test Score: 10/15  Discharge Knowledge Test Score: Will reassess at discharge.    OTHER CORE COMPONENTS/ RISK FACTOR PLAN   Other Core components/Risk Factor Goals:                                                                                                                                                    1.  Increase knowledge test score by discharge. Attending education.  Has been in cardiac rehab in the past.   2.  Decrease dyspnea on exertion while in the program. Remains present. Encouraged to discuss with MD.     Other Components/ Risk Factors Intervention/Education:  *Will continue to monitor HR, BP, dyspnea and arrhythmias each session.   *Will meet with patient to discuss goals & progress.  *Encouraged review of education materials.     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; work related, owns business  Using stress management skills: Yes; keeps things in perspective; does art and exercise but keeps very focused and has purpose with all activities; does not see them as \"relaxing.\"  HX of anxiety: Yes; remote; has learned coping techniques  HX of depression: No  Patient questioned regarding any new stress, depression and anxiety symptoms: No    Family/Support System: Wife, Jainism  Seeing mental health provider: No  Psychosocial medications:    Initial PHQ-9 score: 10 (Moderate " "Risk)  Discharge PHQ-9 score:   Was PHQ-9 faxed to provider: No  Date faxed:    Quality of Life Survey: SF-36 Pre Post   Physical Component Score 55.86 TBD   Mental Component Score 30.72 TBD     Stages of change:  Preparation    PSYCHOSOCIAL PLAN  Psychosocial Goals:  1. Improve stage of change from \"Preparation\" to \"Action\" while in the program. Reports healthy lifestyle but not evident.   2. Improve PHQ-9 category classification from \"Moderate Risk\" to \"Mild Risk\" while in the program. Pt has good attitude, and attendance.     Psychosocial Interventions/ Education:  *Aim to progress 1 MET every 6 weeks per reported dyspnea and pain  *Incorporate resistance training for muscular endurance and strength.  *Will introduce new equipment as appropriate. Alternating upper and lower body.     Education: Intimacy & Heart Disease    EXERCISE REASSESSMENT  Home Exercise: Yes  Frequency: 6 days/week for 45 minutes  Mode:  [place]; 15 minutes on treadmill plus resistance exercises (alternates upper and lower body days) ; does moderate intensity    EXERCISE PLAN  Exercise Goals:   1. Goal of 5.6 METs by discharge. Currently working at peak of 4.6 METs.  2. Have a plan in place for continued exercise after the program by discharge. Currently exercising 3 days a week at [employer] [facility]    Exercise Prescription:   Based on 12 Minute Walk Test  Frequency: 3 days per week  Duration (total aerobic min.): 30 minutes  Intensity RPE: 11-14  Target HR: Rest +30; (age-predicted: )  MET Level Range: 3.3-4.6     Modality METS Load  Duration   1 Warm Up    05:00   2 Upright Bike 4 60 tejada Level 3 06:00   3 Arms Airdyne 2.4 0.9 load  06:00   4 Treadmill 4 2.8 mph 2% 06:00   5 NuStep 2.9 74 tejada Level 5 06:00   6 Recumbent Bike 3.7 52 tejada Level 2 06:00   7 Cool Down     05:00     Exercise Intervention/Education:   *Aim to progress 1 MET every 6 Weeks or as tolerated.  *Incorporate resistance training for muscular endurance and " strength.    Education: Individual MET Levels, Maintenance Phase, Benefits of Exercise, Enjoying Exercise, Home Exercise      Date of first exercise session: 5/21/2024      LEARNING ASSESSMENT & BARRIERS  Readiness to Learn: Eager to learn  Barriers: has had cataract surgery for full vision; does not use reading glasses; mild [place]  Comments:    FALL RISK  moderate  Comments:  Patient has steady gait on treadmill but does have neuropathy in feet.    INDIVIDUAL PATIENT GOALS:   Learn breathing techniques to improve shortness of breath while in the program.  Encourage paced exercise.   2.    Return to regular exercise routine at [email or web address] by discharge. Currently exercising at [employer] on days off.     MEDICATIONS  Aspirin 81mg daily; Atorvastatin 80mg daily; Cardivedilol 12.5mg BID; Ticagrelor 90mg BID; Entresto 49-51mg BID; Ascorbic Acid 500mg daily; Empagliflozin 25mg daily; Multivitamin 1 tab QID; Probiotic  1 tab daily; CoQ10 100mg daily; Zinc 1 tab daily; Homocysteine support 1 tab daily; Vitamin D3+K2 1 tab daily; Ubiquinol 100mg daily; Focus IQ 1 tab daily; N-Acetyl L-Cysteine 1 tab daily; Whole B complex 1 tab daily; Juice plus vegetable+sindhu blend 1 tab daily; Cyruta plus 1 tab daily; Cardio plus 1 tab daily; Glutathione 100mg daily; Turmeric carcumin 1 tab daily; Ginkgo biloba 120mg daily    STAFF COMMENTS:   Patient has completed 20/36 sessions. Pt has good attendance and attitude. Has extensive cardiac history. Has done cardiac rehab in the past. 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 Little evidence of this with pts weight, increased HgbA1C and blood sugars. Continues to exercise at [employer] on days off of cardiac rehab. Able to tolerate moderate exercise, continues to have dyspnea. Pt reports Dr. Salmon took him off of all his medications except Brilinta due to complaints of diarrhea. Added back meds to regimen one at a time to eliminate issues. Pt  has history of non compliance with meds and taking an abundance of supplements.

## 2024-07-18 LAB
ESTROGEN SERPL-MCNC: 104 PG/ML (ref 56–213)
SCAN RESULT: NORMAL
TESTOSTERONE FREE (CHAN): 63.3 PG/ML (ref 30–135)
TESTOSTERONE,TOTAL,LC-MS/MS: 452 NG/DL (ref 250–1100)

## 2024-07-19 ENCOUNTER — CLINICAL SUPPORT (OUTPATIENT)
Dept: CARDIAC REHAB | Facility: CLINIC | Age: 79
End: 2024-07-19
Payer: MEDICARE

## 2024-07-19 DIAGNOSIS — Z95.5 S/P CORONARY ARTERY STENT PLACEMENT: ICD-10-CM

## 2024-07-19 PROCEDURE — 93798 PHYS/QHP OP CAR RHAB W/ECG: CPT

## 2024-07-22 ENCOUNTER — CLINICAL SUPPORT (OUTPATIENT)
Dept: CARDIAC REHAB | Facility: CLINIC | Age: 79
End: 2024-07-22
Payer: MEDICARE

## 2024-07-22 DIAGNOSIS — Z95.5 S/P CORONARY ARTERY STENT PLACEMENT: ICD-10-CM

## 2024-07-22 PROCEDURE — 93798 PHYS/QHP OP CAR RHAB W/ECG: CPT

## 2024-07-24 ENCOUNTER — CLINICAL SUPPORT (OUTPATIENT)
Dept: CARDIAC REHAB | Facility: CLINIC | Age: 79
End: 2024-07-24
Payer: MEDICARE

## 2024-07-24 DIAGNOSIS — Z95.5 S/P CORONARY ARTERY STENT PLACEMENT: ICD-10-CM

## 2024-07-24 PROCEDURE — 93798 PHYS/QHP OP CAR RHAB W/ECG: CPT

## 2024-07-26 ENCOUNTER — APPOINTMENT (OUTPATIENT)
Dept: CARDIAC REHAB | Facility: CLINIC | Age: 79
End: 2024-07-26
Payer: MEDICARE

## 2024-07-29 ENCOUNTER — APPOINTMENT (OUTPATIENT)
Dept: CARDIAC REHAB | Facility: CLINIC | Age: 79
End: 2024-07-29
Payer: MEDICARE

## 2024-07-31 ENCOUNTER — APPOINTMENT (OUTPATIENT)
Dept: CARDIAC REHAB | Facility: CLINIC | Age: 79
End: 2024-07-31
Payer: MEDICARE

## 2024-08-01 ENCOUNTER — TELEPHONE (OUTPATIENT)
Dept: CARDIOLOGY | Facility: CLINIC | Age: 79
End: 2024-08-01
Payer: MEDICARE

## 2024-08-01 DIAGNOSIS — G47.33 OSA (OBSTRUCTIVE SLEEP APNEA): Primary | ICD-10-CM

## 2024-08-01 NOTE — TELEPHONE ENCOUNTER
Mrs. Hoffman called today; she is concerned about her . He is having SOB and she strongly believes the Coreg is the cause. She did do a trial and error of the medications at one point and this is why she believes the Coreg is the cause. Can you suggest changes in medication regimen or give her a call to discuss.

## 2024-08-01 NOTE — PROGRESS NOTES
Spoke to the patient's wife.  The patient has been complaining of morning cough and shortness of breath associated with the cough.  It is better throughout the rest of the day.  He was using a hyperbaric oxygen chamber while he was in town but has been down in [place] for the last couple of weeks.  He says that the shortness of breath was much better when he was using the hyperbaric oxygen chamber.    The patient's wife is suspicious that the carvedilol might be causing the problem.  He had been on Coreg 12.5 mg twice a day but they decrease it to 6.25 mg twice a day to see if that helped.  It did not seem to help necessarily.  Again his symptoms are almost exclusively in the morning when he first wakes up.    He does have a history of sleep apnea but has been intolerant of CPAP masks as well as nasal pillows.  He is not using any treatment for this.    I explained that the most likely etiology of his morning cough and shortness of breath is more likely due to a condition such as untreated sleep apnea, postnasal drip, or even GERD.  I do not necessarily suspect that the carvedilol is the culprit.  Regardless I have asked him to stop the medicine for 3 or 4 days and reassess.  If his symptoms do not improve by being off the medicine for few days I doubt that the medicine is the problem and I would recommend that he go back on it.    He will be coming back into town later this week and his wife will call with an update on how he did off the carvedilol.

## 2024-08-02 ENCOUNTER — APPOINTMENT (OUTPATIENT)
Dept: CARDIAC REHAB | Facility: CLINIC | Age: 79
End: 2024-08-02
Payer: MEDICARE

## 2024-08-05 ENCOUNTER — CLINICAL SUPPORT (OUTPATIENT)
Dept: CARDIAC REHAB | Facility: CLINIC | Age: 79
End: 2024-08-05
Payer: MEDICARE

## 2024-08-05 DIAGNOSIS — Z95.5 S/P CORONARY ARTERY STENT PLACEMENT: ICD-10-CM

## 2024-08-05 PROCEDURE — 93798 PHYS/QHP OP CAR RHAB W/ECG: CPT

## 2024-08-07 ENCOUNTER — APPOINTMENT (OUTPATIENT)
Dept: CARDIAC REHAB | Facility: CLINIC | Age: 79
End: 2024-08-07
Payer: MEDICARE

## 2024-08-09 ENCOUNTER — APPOINTMENT (OUTPATIENT)
Dept: CARDIAC REHAB | Facility: CLINIC | Age: 79
End: 2024-08-09
Payer: MEDICARE

## 2024-08-12 ENCOUNTER — CLINICAL SUPPORT (OUTPATIENT)
Dept: CARDIAC REHAB | Facility: CLINIC | Age: 79
End: 2024-08-12
Payer: MEDICARE

## 2024-08-12 ENCOUNTER — APPOINTMENT (OUTPATIENT)
Dept: CARDIOLOGY | Facility: HOSPITAL | Age: 79
End: 2024-08-12
Payer: MEDICARE

## 2024-08-12 DIAGNOSIS — Z95.5 S/P CORONARY ARTERY STENT PLACEMENT: ICD-10-CM

## 2024-08-12 PROCEDURE — 93798 PHYS/QHP OP CAR RHAB W/ECG: CPT

## 2024-08-14 ENCOUNTER — CLINICAL SUPPORT (OUTPATIENT)
Dept: CARDIAC REHAB | Facility: CLINIC | Age: 79
End: 2024-08-14
Payer: MEDICARE

## 2024-08-14 DIAGNOSIS — Z95.5 S/P CORONARY ARTERY STENT PLACEMENT: ICD-10-CM

## 2024-08-14 PROCEDURE — 93798 PHYS/QHP OP CAR RHAB W/ECG: CPT

## 2024-08-14 NOTE — PROGRESS NOTES
INDIVIDUAL CARDIAC TREATMENT PLAN-90 DAY REASSESSMENT     Name: Phi Hoffman   Today's Date: 24   : 1945    Primary Provider: FELIBERTO Castillo  MRN: [id number]    Referring Physician: RAMON Salmon     Diagnosis: STEMI/ Stents x 2 LAD    Onset Date: 24      Risk Stratification: High      NUTRITION REASSESSMENT  Lipids:   Lipid Lab Date: 24  Total Chol: 241  HDL: 48.2  LDL: 159  Tri  Cholesterol Med: Atorvastatin 80mg @ HS    Diabetes: Yes  HgbA1c: 8.1%  Date Checked: 24  Monitors glucose at home: yes  Fasting Blood Sugar Range:185-207  Frequency: 3 times per week  Hypoglycemic Episode: Denies    Weight Management  Weight: 204 lbs  Height: 68 inches  BMI:  32.08  Pre Body Composition: 27%  Post Body Composition:  Pre Waist Circumference: 45 inches  Post Waist Circumference:  Current Diet: Reports heart healthy diet  Barriers to dietary change: Denies    Initial Dietary Assessment Score: 74/96  Discharge Dietary Assessment Score: Will reassess at discharge.       NUTRITION PLAN  Nutrition Goals:   1. Improve Picture Your Plate assessment results by discharge. Pt states he consumes heart healthy diet but it is not apparent with clinical data.   2. Make changes to diet to include heart healthy options while in the program. Pt does not desire to make changes. States his wife keeps him on a heart healthy diet. Fasting blood sugars have been increasing.      Nutrition Intervention/Education:   *Reassess Picture Your Plate Assessment at discharge.  *Encourage attendance to small group nutrition lecture directed by dietician. Attended 6/3 &   *Monitor weight weekly on facility scale.  *Measure body composition and waist circumference at initial and discharge of program. Goal to have pt lose 1 inch off of waist.   *Notify patient of dietary education topics and handouts.     Education: Building a Healthy Plate, Dietary Fats, Sodium, BMI & Body Composition, Nutrition: Eating Out, Smart Snacking,  "Recipe Makeovers, Individual Cholesterol Levels, Managing Cholesterol      OTHER CORE COMPONENTS/ RISK FACTORS REASSESSMENT  Medication compliance: Poor compliance  Using pill box: Yes  Carries medication list: Yes    Blood Pressure Management:  History of High BP: Yes  Resting BP: 132/72    Tobacco: FORMER  Form of tobacco: Cigarettes; 1 PPD x 20 yrs  Quit Date:  1978  Anyone in the house smoke: No    Initial Knowledge Test Score: 10/15  Discharge Knowledge Test Score: Will reassess at discharge.    OTHER CORE COMPONENTS/ RISK FACTOR PLAN   Other Core components/Risk Factor Goals:                                                                                                                                                    1.  Increase knowledge test score by discharge. Attending education.  Has been in cardiac rehab in the past.   2.  Decrease dyspnea on exertion while in the program. Dyspnea worsening. Pt has called Dr. Salmon's office and talked with PCP. May see new cardiologist. Pt does not appear to follow advice of cardiology from past reports.     Other Components/ Risk Factors Intervention/Education:  *Will continue to monitor HR, BP, dyspnea and arrhythmias each session.   *Will meet with patient to discuss goals & progress.  *Encouraged review of education materials.     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; work related, owns business  Using stress management skills: Yes; keeps things in perspective; does art and exercise but keeps very focused and has purpose with all activities; does not see them as \"relaxing.\"  HX of anxiety: Yes; remote; has learned coping techniques  HX of depression: No  Patient questioned regarding " "any new stress, depression and anxiety symptoms: No    Family/Support System: Wife, Shinto  Seeing mental health provider: No  Psychosocial medications:    Initial PHQ-9 score: 10 (Moderate Risk)  [place] PHQ-9 score: 11(Moderate Risk)  Discharge PHQ-9 score:   Was PHQ-9 faxed to provider: No  Date faxed:    Quality of Life Survey: SF-36 Pre Post   Physical Component Score 55.86 TBD   Mental Component Score 30.72 TBD     Stages of change:  Preparation    PSYCHOSOCIAL PLAN  Psychosocial Goals:  1. Improve stage of change from \"Preparation\" to \"Action\" while in the program. Pt does not appeart to follow healthy lifestyle he reports or follow MD guidance.   2. Improve PHQ-9 category classification from \"Moderate Risk\" to \"Mild Risk\" while in the program. [place] assessment shows continued \"moderate risk.\"    Psychosocial Interventions/ Education:  *Aim to progress 1 MET every 6 weeks per reported dyspnea and pain  *Incorporate resistance training for muscular endurance and strength.  *Will introduce new equipment as appropriate. Alternating upper and lower body.     Education: Intimacy & Heart Disease    EXERCISE REASSESSMENT  Home Exercise: Yes  Frequency: 6 days/week for 45 minutes  Mode:  [employer]; 15 minutes on treadmill plus resistance exercises (alternates upper and lower body days) ; does moderate intensity    EXERCISE PLAN  Exercise Goals:   1. Goal of 5.6 METs by discharge. Currently working at peak of 4.6 METs.  2. Have a plan in place for continued exercise after the program by discharge. Currently exercising 3 days a week at [employer]    Exercise Prescription:   Based on 12 Minute Walk Test  Frequency: 3 days per week  Duration (total aerobic min.): 30 minutes  Intensity RPE: 11-14  Target HR: Rest +30; (age-predicted: )  MET Level Range: 3.1-4.6     Modality METS Load  Duration   1 Warm Up    05:00   2 Upright Bike 4 60 tejada Level 2 06:00   3 Weights  80  06:00   4 Treadmill 4.6 3 mph 3% " 06:00   5 NuStep 3.1 84 tejada Level 5 06:00   6 Recumbent Bike 4.2 64 tejada Level 2 06:00   7 Cool Down     05:00     Exercise Intervention/Education:   *Aim to progress 1 MET every 6 Weeks or as tolerated.  *Incorporate resistance training for muscular endurance and strength.    Education: Individual MET Levels, Maintenance Phase, Benefits of Exercise, Enjoying Exercise, Home Exercise      Date of first exercise session: 5/21/2024      LEARNING ASSESSMENT & BARRIERS  Readiness to Learn: Eager to learn  Barriers: has had cataract surgery for full vision; does not use reading glasses; mild [place]  Comments:    FALL RISK  moderate      INDIVIDUAL PATIENT GOALS:   Learn breathing techniques to improve shortness of breath while in the program.  Pt instructed on pacing, does not follow instruction well.    2.    Return to regular exercise routine at [facility] [employer] by discharge. Currently exercising at [email or web address] on days off.     MEDICATIONS  Aspirin 81mg daily; Atorvastatin 80mg daily; Cardivedilol 12.5mg BID; Ticagrelor 90mg BID; Entresto 49-51mg BID; Ascorbic Acid 500mg daily; Empagliflozin 25mg daily; Multivitamin 1 tab QID; Probiotic  1 tab daily; CoQ10 100mg daily; Zinc 1 tab daily; Homocysteine support 1 tab daily; Vitamin D3+K2 1 tab daily; Ubiquinol 100mg daily; Focus IQ 1 tab daily; N-Acetyl L-Cysteine 1 tab daily; Whole B complex 1 tab daily; Juice plus vegetable+sindhu blend 1 tab daily; Cyruta plus 1 tab daily; Cardio plus 1 tab daily; Glutathione 100mg daily; Turmeric carcumin 1 tab daily; Ginkgo biloba 120mg daily    STAFF COMMENTS:   Patient has completed 27/36 sessions. Pt has increased dyspnea with exercise and exertion at home. Workloads have been decreased. Pt just attempts to work harder/faster. Instructed not to do so and to pace himself to reduce dyspnea. Pt has been non complaint with medications and MD instruction. Has notified PCP of recent increased dyspnea. Pt will be absent for a week.  Third week during program pt will miss.

## 2024-08-15 ENCOUNTER — HOSPITAL ENCOUNTER (OUTPATIENT)
Dept: CARDIOLOGY | Facility: HOSPITAL | Age: 79
Discharge: HOME | End: 2024-08-15
Payer: MEDICARE

## 2024-08-15 DIAGNOSIS — I65.23 OCCLUSION AND STENOSIS OF BILATERAL CAROTID ARTERIES: ICD-10-CM

## 2024-08-15 PROCEDURE — 93880 EXTRACRANIAL BILAT STUDY: CPT

## 2024-08-15 PROCEDURE — 93880 EXTRACRANIAL BILAT STUDY: CPT | Performed by: SURGERY

## 2024-08-16 ENCOUNTER — APPOINTMENT (OUTPATIENT)
Dept: CARDIAC REHAB | Facility: CLINIC | Age: 79
End: 2024-08-16
Payer: MEDICARE

## 2024-08-16 DIAGNOSIS — Z95.5 S/P CORONARY ARTERY STENT PLACEMENT: ICD-10-CM

## 2024-08-16 PROCEDURE — 93798 PHYS/QHP OP CAR RHAB W/ECG: CPT

## 2024-08-19 ENCOUNTER — APPOINTMENT (OUTPATIENT)
Dept: CARDIAC REHAB | Facility: CLINIC | Age: 79
End: 2024-08-19
Payer: MEDICARE

## 2024-08-21 ENCOUNTER — APPOINTMENT (OUTPATIENT)
Dept: CARDIAC REHAB | Facility: CLINIC | Age: 79
End: 2024-08-21
Payer: MEDICARE

## 2024-08-23 ENCOUNTER — APPOINTMENT (OUTPATIENT)
Dept: CARDIAC REHAB | Facility: CLINIC | Age: 79
End: 2024-08-23
Payer: MEDICARE

## 2024-08-26 ENCOUNTER — APPOINTMENT (OUTPATIENT)
Dept: CARDIAC REHAB | Facility: CLINIC | Age: 79
End: 2024-08-26
Payer: MEDICARE

## 2024-08-26 DIAGNOSIS — Z95.5 S/P CORONARY ARTERY STENT PLACEMENT: ICD-10-CM

## 2024-08-26 PROCEDURE — 93798 PHYS/QHP OP CAR RHAB W/ECG: CPT

## 2024-08-28 ENCOUNTER — APPOINTMENT (OUTPATIENT)
Dept: CARDIAC REHAB | Facility: CLINIC | Age: 79
End: 2024-08-28
Payer: MEDICARE

## 2024-08-28 DIAGNOSIS — Z95.5 S/P CORONARY ARTERY STENT PLACEMENT: ICD-10-CM

## 2024-08-28 PROCEDURE — 93798 PHYS/QHP OP CAR RHAB W/ECG: CPT

## 2024-08-30 ENCOUNTER — APPOINTMENT (OUTPATIENT)
Dept: CARDIAC REHAB | Facility: CLINIC | Age: 79
End: 2024-08-30
Payer: MEDICARE

## 2024-08-30 DIAGNOSIS — Z95.5 S/P CORONARY ARTERY STENT PLACEMENT: ICD-10-CM

## 2024-08-30 PROCEDURE — 93798 PHYS/QHP OP CAR RHAB W/ECG: CPT | Performed by: INTERNAL MEDICINE

## 2024-09-04 ENCOUNTER — TELEPHONE (OUTPATIENT)
Dept: PHARMACY | Facility: HOSPITAL | Age: 79
End: 2024-09-04

## 2024-09-04 ENCOUNTER — APPOINTMENT (OUTPATIENT)
Dept: CARDIAC REHAB | Facility: CLINIC | Age: 79
End: 2024-09-04
Payer: MEDICARE

## 2024-09-04 DIAGNOSIS — Z95.5 S/P CORONARY ARTERY STENT PLACEMENT: ICD-10-CM

## 2024-09-04 PROCEDURE — 93798 PHYS/QHP OP CAR RHAB W/ECG: CPT

## 2024-09-04 NOTE — TELEPHONE ENCOUNTER
I reviewed the telephone encounter and agree with the student’s findings and plans as written. Case discussed with student.    Bess Miller, PharmD

## 2024-09-04 NOTE — TELEPHONE ENCOUNTER
Population Health: Outreach by Ambulatory Pharmacy Team    Payor: [employer] [place]  Reason: Adherence  Medication: Jardiance and Synjardy XR  Outcome: Left Voicemail    SARA CONNOR

## 2024-09-06 ENCOUNTER — APPOINTMENT (OUTPATIENT)
Dept: CARDIAC REHAB | Facility: CLINIC | Age: 79
End: 2024-09-06
Payer: MEDICARE

## 2024-09-06 DIAGNOSIS — Z95.5 S/P CORONARY ARTERY STENT PLACEMENT: ICD-10-CM

## 2024-09-06 PROCEDURE — 93798 PHYS/QHP OP CAR RHAB W/ECG: CPT

## 2024-09-09 ENCOUNTER — APPOINTMENT (OUTPATIENT)
Dept: CARDIAC REHAB | Facility: CLINIC | Age: 79
End: 2024-09-09
Payer: MEDICARE

## 2024-09-09 DIAGNOSIS — Z95.5 S/P CORONARY ARTERY STENT PLACEMENT: ICD-10-CM

## 2024-09-09 PROCEDURE — 93798 PHYS/QHP OP CAR RHAB W/ECG: CPT

## 2024-09-11 ENCOUNTER — APPOINTMENT (OUTPATIENT)
Dept: CARDIAC REHAB | Facility: CLINIC | Age: 79
End: 2024-09-11
Payer: MEDICARE

## 2024-09-11 DIAGNOSIS — Z95.5 S/P CORONARY ARTERY STENT PLACEMENT: ICD-10-CM

## 2024-09-11 PROCEDURE — 93798 PHYS/QHP OP CAR RHAB W/ECG: CPT

## 2024-09-13 ENCOUNTER — APPOINTMENT (OUTPATIENT)
Dept: CARDIAC REHAB | Facility: CLINIC | Age: 79
End: 2024-09-13
Payer: MEDICARE

## 2024-09-13 NOTE — PROGRESS NOTES
INDIVIDUAL CARDIAC TREATMENT PLAN-DISCHARGE     Name: Phi Hoffman   Today's Date: 24   : 1945    Primary Provider: FELIBERTO Castillo  MRN: [id number]    Referring Physician: RAMON Salmon     Diagnosis: STEMI/ Stents x 2 LAD    Onset Date: 24      Risk Stratification: High      NUTRITION DISCHARGE/ FOLLOW-UP  Lipids:   Lipid Lab Date: 24  Total Chol: 241  HDL: 48.2  LDL: 159  Tri  Cholesterol Med: Atorvastatin 80mg @ HS    Diabetes: Yes  HgbA1c: 8.1%  Date Checked: 24  Monitors glucose at home: yes  Fasting Blood Sugar Range:145-190  Frequency: 3 times per week  Hypoglycemic Episode: Denies    Weight Management  Weight: 208 lbs  Height: 68 inches  BMI:  31.6  Pre Body Composition: 27%  Post Body Composition: 27%  Pre Waist Circumference: 45 inches  Post Waist Circumference: 44.5 inches  Current Diet: Reports heart healthy diet  Barriers to dietary change: Denies, but diet does not appear healthy    Initial Dietary Assessment Score: 74/96  Discharge Dietary Assessment Score:  69/96      NUTRITION PLAN  Nutrition Goals:   1. Unable to improve Picture Your Plate assessment results. Score decreased. HgbA1C has increased.   2. Make changes to diet to include heart healthy options while in the program. Pt feels he has a very healthy diet. This is not supported by clinical data and surveys.    Nutrition Intervention/Education:   *Compare initial vs discharge Picture Your Plate Assessment.  *Compare initial vs discharge body composition and waist circumference. Pt was able to lose 1/2 inch at waist, decreased 1lb fat wt and 2lbs lean wt.    Education: Building a Healthy Plate, Dietary Fats, Sodium, BMI & Body Composition, Nutrition: Eating Out, Smart Snacking, Recipe Makeovers, Individual Cholesterol Levels, Managing Cholesterol, Diabetes & Exercise      OTHER CORE COMPONENTS/ RISK FACTORS DISCHARGE/FOLLOW-UP  Medication compliance: Poor compliance  Using pill box: Yes  Carries medication list:  "Yes    Blood Pressure Management:  History of High BP: Yes  Resting BP:     Tobacco: FORMER  Form of tobacco: Cigarettes; 1 PPD x 20 yrs  Quit Date:  1978  Anyone in the house smoke: No    Initial Knowledge Test Score: 10/15  Discharge Knowledge Test Score: 13/15    OTHER CORE COMPONENTS/ RISK FACTOR PLAN   Other Core components/Risk Factor Goals:                                                                                                                                                    1.  Able to increase knowledge test score by.    2.  Decrease dyspnea on exertion while in the program. Pt denies dyspnea during most recent sessions. Encouraging pt to pace himself. Not necessary to have vigorous exercise.     Other Components/ Risk Factors Intervention/Education:  *Will continue to monitor HR, BP, dyspnea and arrhythmias each session.   *Met with pt 1:1 to review program goals and outcomes.   *Encouraged review of education materials.     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  Patient questioned regarding any new stress, depression and anxiety symptoms:Yes, denies    Family/Support System: Wife, [employer]  Seeing mental health provider: No  Psychosocial medications: None    Initial PHQ-9 score: 10 (Moderate Risk)  [place] PHQ-9 score: 11(Moderate " "Risk)  Discharge PHQ-9 score: 4 (No Risk)  Was PHQ-9 faxed to provider: No  Date faxed: [facility]CO    Quality of Life Survey: SF-36 Pre Post   Physical Component Score 55.86 44.85   Mental Component Score 30.72 45.71     Stages of change:  Preparation    PSYCHOSOCIAL PLAN  Psychosocial Goals:  1. Improve stage of change from \"Preparation\" to \"Action\" while in the program. Pt engages in home exercise but does not show an improved diet.   2. Improved PHQ-9 category classification from \"Moderate Risk\" to \"Mild Risk\" while in the program. Pt shows strong emotional health. Has a good support system and encouraging wife.     Psychosocial Interventions/ Education:  *Aim to progress 1 MET every 6 weeks per reported dyspnea and pain  *Incorporate resistance training for muscular endurance and strength.  *Will introduce new equipment as appropriate. Alternating upper and lower body.     Education: Intimacy & Heart Disease, Stress Series, Emotional Series     EXERCISE DISCHARGE/FOLLOW-UP  Home Exercise: Yes  Frequency: 6 days/week for 45 minutes  Mode:  [employer]; 15 minutes on treadmill plus resistance exercises (alternates upper and lower body days) ; does moderate intensity    EXERCISE PLAN  Exercise Goals:   1. Goal of 5.6 METs by discharge. Currently working at peak of 4.6 METs.  2. Have a plan in place for continued exercise after the program by discharge. Currently exercising 3 days a week at [employer]    Exercise Prescription:   Based on 12 Minute Walk Test  Frequency: 3 days per week  Duration (total aerobic min.): 30 minutes  Intensity RPE: 11-14  Target HR: Rest +30; (age-predicted: )  MET Level Range: 3.1-4.6     Modality METS Load  Duration   1 Warm Up    05:00   2 Upright Bike 4 60 [place]ada Level 2 06:00   3 Weights  80  06:00   4 Treadmill 4.6 3 mph 3% 06:00   5 NuStep 2.5 56 [place] Level 5 06:00   6 Recumbent Bike 4.2 58 tejada Level 2 06:00   7 Cool Down     05:00     Exercise Intervention/Education:   *Aim " to progress 1 MET every 6 Weeks or as tolerated.  *Incorporate resistance training for muscular endurance and strength.    Education: Individual MET Levels, Maintenance Phase, Benefits of Exercise, Enjoying Exercise, Home Exercise      Date of first exercise session: 5/21/2024      LEARNING ASSESSMENT & BARRIERS  Readiness to Learn: Eager to learn  Barriers: has had cataract surgery for full vision; does not use reading glasses; mild [place]  Comments: Pt and wife do independent research. Alternative views to cardiac health.     FALL RISK  moderate      INDIVIDUAL PATIENT GOALS:   Learn breathing techniques to improve shortness of breath while in the program.  Pt instructed on pacing, improvements noted with decreased complaints of dyspnea during exercise.    2.    Return to regular exercise routine at [employer] by discharge. Currently exercising at [employer] on days off.     MEDICATIONS  Aspirin 81mg daily; Atorvastatin 80mg daily; Cardivedilol 12.5mg BID; Ticagrelor 90mg BID; Entresto 49-51mg BID; Ascorbic Acid 500mg daily; Empagliflozin 25mg daily; Multivitamin 1 tab QID; Probiotic  1 tab daily; CoQ10 100mg daily; Zinc 1 tab daily; Homocysteine support 1 tab daily; Vitamin D3+K2 1 tab daily; Ubiquinol 100mg daily; Focus IQ 1 tab daily; N-Acetyl L-Cysteine 1 tab daily; Whole B complex 1 tab daily; Juice plus vegetable+sindhu blend 1 tab daily; Cyruta plus 1 tab daily; Cardio plus 1 tab daily; Glutathione 100mg daily; Turmeric carcumin 1 tab daily; Ginkgo biloba 120mg daily    STAFF COMMENTS:   Patient has completed full program 36/36 sessions. Pt is very pleasant and has dedication to exercise. Able to increase exercise capacity by 48%. Pt is limited with exercise due to chronic dyspnea and significant chronic CAD. Pt encouraged and instructed on necessity to pace during exercise and eliminate ideas that he needs to engage in vigorous exercise.  Pt has poor compliance with medications and has alternative  views to cardiac health. Continues to engage in home exercise at [employer]

## 2024-09-13 NOTE — PROGRESS NOTES
NDIVIDUAL CARDIAC TREATMENT PLAN-DISCHARGE                Name: Phi Hoffman                               Today's Date: 24           : 1945                                              Primary Provider: FELIBERTO Castillo  MRN: [id number]                                              Referring Physician: RAMON Salmon                                   Diagnosis: STEMI/ Stents x 2 LAD                                       Onset Date: 24                    Risk Stratification: High        NUTRITION DISCHARGE/ FOLLOW-UP  Lipids:   Lipid Lab Date: 24  Total Chol: 241  HDL: 48.2  LDL: 159  Tri  Cholesterol Med: Atorvastatin 80mg @ HS     Diabetes: Yes  HgbA1c: 8.1%  Date Checked: 24  Monitors glucose at home: yes  Fasting Blood Sugar Range:145-190  Frequency: 3 times per week  Hypoglycemic Episode: Denies     Weight Management  Weight: 208 lbs  Height: 68 inches  BMI:  31.6  Pre Body Composition: 27%  Post Body Composition: 27%  Pre Waist Circumference: 45 inches  Post Waist Circumference: 44.5 inches  Current Diet: Reports heart healthy diet  Barriers to dietary change: Denies, but diet does not appear healthy     Initial Dietary Assessment Score: 74/96  Discharge Dietary Assessment Score:  69/96        NUTRITION PLAN  Nutrition Goals:   1. Unable to improve Picture Your Plate assessment results. Score decreased. HgbA1C has increased.   2. Make changes to diet to include heart healthy options while in the program. Pt feels he has a very healthy diet. This is not supported by clinical data and surveys.     Nutrition Intervention/Education:   *Compare initial vs discharge Picture Your Plate Assessment.  *Compare initial vs discharge body composition and waist circumference. Pt was able to lose 1/2 inch at waist, decreased 1lb fat wt and 2lbs lean wt.     Education: Building a Healthy Plate, Dietary Fats, Sodium, BMI & Body Composition, Nutrition: Eating Out, Smart Snacking, Recipe Makeovers,  "Individual Cholesterol Levels, Managing Cholesterol, Diabetes & Exercise        OTHER CORE COMPONENTS/ RISK FACTORS DISCHARGE/FOLLOW-UP  Medication compliance: Poor compliance  Using pill box: Yes  Carries medication list: Yes     Blood Pressure Management:  History of High BP: Yes  Resting BP: 124/60     Tobacco: FORMER  Form of tobacco: Cigarettes; 1 PPD x 20 yrs  Quit Date:  1978  Anyone in the house smoke: No     Initial Knowledge Test Score: 10/15  Discharge Knowledge Test Score: 13/15     OTHER CORE COMPONENTS/ RISK FACTOR PLAN   Other Core components/Risk Factor Goals:                                                                                                                                                    1.  Able to increase knowledge test score by.    2.  Decrease dyspnea on exertion while in the program. Pt denies dyspnea during most recent sessions. Encouraging pt to pace himself. Not necessary to have vigorous exercise.      Other Components/ Risk Factors Intervention/Education:  *Will continue to monitor HR, BP, dyspnea and arrhythmias each session.   *Met with pt 1:1 to review program goals and outcomes.   *Encouraged review of education materials.      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  Patient questioned regarding any new " "stress, depression and anxiety symptoms:Yes, denies     Family/Support System: Wife, [employer]  Seeing mental health provider: No  Psychosocial medications: None     Initial PHQ-9 score: 10 (Moderate Risk)  [place] PHQ-9 score: 11(Moderate Risk)  Discharge PHQ-9 score: 4 (No Risk)  Was PHQ-9 faxed to provider: No  Date faxed: [facility]CO     Quality of Life Survey: SF-36 Pre Post   Physical Component Score 55.86 44.85   Mental Component Score 30.72 45.71      Stages of change:  Preparation     PSYCHOSOCIAL PLAN  Psychosocial Goals:  1. Improve stage of change from \"Preparation\" to \"Action\" while in the program. Pt engages in home exercise but does not show an improved diet.   2. Improved PHQ-9 category classification from \"Moderate Risk\" to \"Mild Risk\" while in the program. Pt shows strong emotional health. Has a good support system and encouraging wife.      Psychosocial Interventions/ Education:  *Aim to progress 1 MET every 6 weeks per reported dyspnea and pain  *Incorporate resistance training for muscular endurance and strength.  *Will introduce new equipment as appropriate. Alternating upper and lower body.      Education: Intimacy & Heart Disease, Stress Series, Emotional Series      EXERCISE DISCHARGE/FOLLOW-UP  Home Exercise: Yes  Frequency: 6 days/week for 45 minutes  Mode:  [employer]; 15 minutes on treadmill plus resistance exercises (alternates upper and lower body days) ; does moderate intensity     EXERCISE PLAN  Exercise Goals:   1. Goal of 5.6 METs by discharge. Currently working at peak of 4.6 METs.  2. Have a plan in place for continued exercise after the program by discharge. Currently exercising 3 days a week at [employer]     Exercise Prescription:   Based on 12 Minute Walk Test  Frequency: 3 days per week  Duration (total aerobic min.): 30 minutes  Intensity RPE: 11-14  Target HR: Rest +30; (age-predicted: )  MET Level Range: 3.1-4.6       Modality METS Load   Duration   1 Warm Up       05:00 "   2 Upright Bike 4 60 tejada Level 2 06:00   3 Weights   80   06:00   4 Treadmill 4.6 3 mph 3% 06:00   5 NuStep 2.5 56 tejada Level 5 06:00   6 Recumbent Bike 4.2 58 tejada Level 2 06:00   7 Cool Down        05:00      Exercise Intervention/Education:   *Aim to progress 1 MET every 6 Weeks or as tolerated.  *Incorporate resistance training for muscular endurance and strength.     Education: Individual MET Levels, Maintenance Phase, Benefits of Exercise, Enjoying Exercise, Home Exercise        Date of first exercise session: 5/21/2024       LEARNING ASSESSMENT & BARRIERS  Readiness to Learn: Eager to learn  Barriers: has had cataract surgery for full vision; does not use reading glasses; mild [place]  Comments: Pt and wife do independent research. Alternative views to cardiac health.      FALL RISK  moderate        INDIVIDUAL PATIENT GOALS:   Learn breathing techniques to improve shortness of breath while in the program.  Pt instructed on pacing, improvements noted with decreased complaints of dyspnea during exercise.    2.    Return to regular exercise routine at [employer] by discharge. Currently exercising at [employer] on days off.      MEDICATIONS  Aspirin 81mg daily; Atorvastatin 80mg daily; Cardivedilol 12.5mg BID; Ticagrelor 90mg BID; Entresto 49-51mg BID; Ascorbic Acid 500mg daily; Empagliflozin 25mg daily; Multivitamin 1 tab QID; Probiotic  1 tab daily; CoQ10 100mg daily; Zinc 1 tab daily; Homocysteine support 1 tab daily; Vitamin D3+K2 1 tab daily; Ubiquinol 100mg daily; Focus IQ 1 tab daily; N-Acetyl L-Cysteine 1 tab daily; Whole B complex 1 tab daily; Juice plus vegetable+sindhu blend 1 tab daily; Cyruta plus 1 tab daily; Cardio plus 1 tab daily; Glutathione 100mg daily; Turmeric carcumin 1 tab daily; Ginkgo biloba 120mg daily     STAFF COMMENTS:   Patient has completed full program. Pt is very pleasant and has dedication to exercise. Able to increase exercise capacity by 48%. Pt is limited with  exercise due to chronic dyspnea and significant chronic CAD. Pt encouraged and instructed on necessity to pace during exercise and eliminate ideas that he needs to engage in vigorous exercise.  Pt has poor compliance with medications and has alternative views to cardiac health. Continues to engage in home exercise at [employer].

## 2024-09-13 NOTE — PROGRESS NOTES
INDIVIDUAL CARDIAC TREATMENT PLAN-DISCHARGE     Name: Phi Hoffman   Today's Date: 24   : 1945    Primary Provider: FELIBERTO Castillo  MRN: [id number]    Referring Physician: RAMON Salmon     Diagnosis: STEMI/ Stents x 2 LAD    Onset Date: 24      Risk Stratification: High      NUTRITION DISCHARGE/ FOLLOW-UP  Lipids:   Lipid Lab Date: 24  Total Chol: 241  HDL: 48.2  LDL: 159  Tri  Cholesterol Med: Atorvastatin 80mg @ HS    Diabetes: Yes  HgbA1c: 8.1%  Date Checked: 24  Monitors glucose at home: yes  Fasting Blood Sugar Range:145-190  Frequency: 3 times per week  Hypoglycemic Episode: Denies    Weight Management  Weight: 208 lbs  Height: 68 inches  BMI:  31.6  Pre Body Composition: 27%  Post Body Composition: 27%  Pre Waist Circumference: 45 inches  Post Waist Circumference: 44.5 inches  Current Diet: Reports heart healthy diet  Barriers to dietary change: Denies, but diet does not appear healthy    Initial Dietary Assessment Score: 74/96  Discharge Dietary Assessment Score:  69/96      NUTRITION PLAN  Nutrition Goals:   1. Unable to improve Picture Your Plate assessment results. Score decreased. HgbA1C has increased.   2. Make changes to diet to include heart healthy options while in the program. Pt feels he has a very healthy diet. This is not supported by clinical data and surveys.    Nutrition Intervention/Education:   *Compare initial vs discharge Picture Your Plate Assessment.  *Compare initial vs discharge body composition and waist circumference. Pt was able to lose 1/2 inch at waist, decreased 1lb fat wt and 2lbs lean wt.    Education: Building a Healthy Plate, Dietary Fats, Sodium, BMI & Body Composition, Nutrition: Eating Out, Smart Snacking, Recipe Makeovers, Individual Cholesterol Levels, Managing Cholesterol, Diabetes & Exercise      OTHER CORE COMPONENTS/ RISK FACTORS DISCHARGE/FOLLOW-UP  Medication compliance: Poor compliance  Using pill box: Yes  Carries medication list:  "Yes    Blood Pressure Management:  History of High BP: Yes  Resting BP: 124/60    Tobacco: FORMER  Form of tobacco: Cigarettes; 1 PPD x 20 yrs  Quit Date:  1978  Anyone in the house smoke: No    Initial Knowledge Test Score: 10/15  Discharge Knowledge Test Score: 13/15    OTHER CORE COMPONENTS/ RISK FACTOR PLAN   Other Core components/Risk Factor Goals:                                                                                                                                                    1.  Able to increase knowledge test score by.    2.  Decrease dyspnea on exertion while in the program. Pt denies dyspnea during most recent sessions. Encouraging pt to pace himself. Not necessary to have vigorous exercise.     Other Components/ Risk Factors Intervention/Education:  *Will continue to monitor HR, BP, dyspnea and arrhythmias each session.   *Met with pt 1:1 to review program goals and outcomes.   *Encouraged review of education materials.     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  Patient questioned regarding any new stress, depression and anxiety symptoms:Yes, denies    Family/Support System: Wife, [employer]  Seeing mental health provider: No  Psychosocial medications: None    Initial PHQ-9 score: 10 (Moderate Risk)  [place] PHQ-9 score: 11(Moderate " "Risk)  Discharge PHQ-9 score: 4 (No Risk)  Was PHQ-9 faxed to provider: No  Date faxed: [facility]CO    Quality of Life Survey: SF-36 Pre Post   Physical Component Score 55.86 44.85   Mental Component Score 30.72 45.71     Stages of change:  Preparation    PSYCHOSOCIAL PLAN  Psychosocial Goals:  1. Improve stage of change from \"Preparation\" to \"Action\" while in the program. Pt engages in home exercise but does not show an improved diet.   2. Improved PHQ-9 category classification from \"Moderate Risk\" to \"Mild Risk\" while in the program. Pt shows strong emotional health. Has a good support system and encouraging wife.     Psychosocial Interventions/ Education:  *Aim to progress 1 MET every 6 weeks per reported dyspnea and pain  *Incorporate resistance training for muscular endurance and strength.  *Will introduce new equipment as appropriate. Alternating upper and lower body.     Education: Intimacy & Heart Disease, Stress Series, Emotional Series     EXERCISE DISCHARGE/FOLLOW-UP  Home Exercise: Yes  Frequency: 6 days/week for 45 minutes  Mode:  [employer]; 15 minutes on treadmill plus resistance exercises (alternates upper and lower body days) ; does moderate intensity    EXERCISE PLAN  Exercise Goals:   1. Goal of 5.6 METs by discharge. Currently working at peak of 4.6 METs.  2. Have a plan in place for continued exercise after the program by discharge. Currently exercising 3 days a week at [employer]    Exercise Prescription:   Based on 12 Minute Walk Test  Frequency: 3 days per week  Duration (total aerobic min.): 30 minutes  Intensity RPE: 11-14  Target HR: Rest +30; (age-predicted: )  MET Level Range: 3.1-4.6     Modality METS Load  Duration   1 Warm Up    05:00   2 Upright Bike 4 60 [place]ada Level 2 06:00   3 Weights  80  06:00   4 Treadmill 4.6 3 mph 3% 06:00   5 NuStep 2.5 56 [place] Level 5 06:00   6 Recumbent Bike 4.2 58 te[place]ada Level 2 06:00   7 Cool Down     05:00     Exercise Intervention/Education:   *Aim " to progress 1 MET every 6 Weeks or as tolerated.  *Incorporate resistance training for muscular endurance and strength.    Education: Individual MET Levels, Maintenance Phase, Benefits of Exercise, Enjoying Exercise, Home Exercise      Date of first exercise session: 5/21/2024      LEARNING ASSESSMENT & BARRIERS  Readiness to Learn: Eager to learn  Barriers: has had cataract surgery for full vision; does not use reading glasses; mild [place]  Comments: Pt and wife do independent research. Alternative views to cardiac health.     FALL RISK  moderate      INDIVIDUAL PATIENT GOALS:   Learn breathing techniques to improve shortness of breath while in the program.  Pt instructed on pacing, improvements noted with decreased complaints of dyspnea during exercise.    2.    Return to regular exercise routine at [employer] by discharge. Currently exercising at [employer] on days off.     MEDICATIONS  Aspirin 81mg daily; Atorvastatin 80mg daily; Cardivedilol 12.5mg BID; Ticagrelor 90mg BID; Entresto 49-51mg BID; Ascorbic Acid 500mg daily; Empagliflozin 25mg daily; Multivitamin 1 tab QID; Probiotic  1 tab daily; CoQ10 100mg daily; Zinc 1 tab daily; Homocysteine support 1 tab daily; Vitamin D3+K2 1 tab daily; Ubiquinol 100mg daily; Focus IQ 1 tab daily; N-Acetyl L-Cysteine 1 tab daily; Whole B complex 1 tab daily; Juice plus vegetable+sindhu blend 1 tab daily; Cyruta plus 1 tab daily; Cardio plus 1 tab daily; Glutathione 100mg daily; Turmeric carcumin 1 tab daily; Ginkgo biloba 120mg daily    STAFF COMMENTS:   Patient has completed full program. Pt is very pleasant and has dedication to exercise. Able to increase exercise capacity by 48%. Pt is limited with exercise due to chronic dyspnea and significant chronic CAD. Pt encouraged and instructed on necessity to pace during exercise and eliminate ideas that he needs to engage in vigorous exercise.  Pt has poor compliance with medications and has alternative views to cardiac  health. Continues to engage in home exercise at [employer].

## 2024-09-16 ENCOUNTER — APPOINTMENT (OUTPATIENT)
Dept: CARDIAC REHAB | Facility: CLINIC | Age: 79
End: 2024-09-16
Payer: MEDICARE

## 2024-09-16 DIAGNOSIS — Z95.5 S/P CORONARY ARTERY STENT PLACEMENT: ICD-10-CM

## 2024-09-16 PROCEDURE — 93798 PHYS/QHP OP CAR RHAB W/ECG: CPT

## 2024-09-16 NOTE — PROGRESS NOTES
INDIVIDUAL CARDIAC TREATMENT PLAN-DISCHARGE     Name: Phi Hoffman   Today's Date: 24   : 1945    Primary Provider: FELIBERTO Castillo  MRN: [id number]    Referring Physician: RAMON Salmon     Diagnosis: STEMI/ Stents x 2 LAD    Onset Date: 24      Risk Stratification: High      NUTRITION DISCHARGE/ FOLLOW-UP  Lipids:   Lipid Lab Date: 24  Total Chol: 241  HDL: 48.2  LDL: 159  Tri  Cholesterol Med: Atorvastatin 80mg @ HS    Diabetes: Yes  HgbA1c: 8.1%  Date Checked: 24  Monitors glucose at home: yes  Fasting Blood Sugar Range:145-190  Frequency: 3 times per week  Hypoglycemic Episode: Denies    Weight Management  Weight: 208 lbs  Height: 68 inches  BMI:  31.6  Pre Body Composition: 27%  Post Body Composition: 27%  Pre Waist Circumference: 45 inches  Post Waist Circumference: 44.5 inches  Current Diet: Reports heart healthy diet  Barriers to dietary change: Denies, but diet does not appear healthy    Initial Dietary Assessment Score: 74/96  Discharge Dietary Assessment Score:  69/96      NUTRITION PLAN  Nutrition Goals:   1. Unable to improve Picture Your Plate assessment results. Score decreased. HgbA1C has increased.   2. Make changes to diet to include heart healthy options while in the program. Pt feels he has a very healthy diet. This is not supported by clinical data and surveys.    Nutrition Intervention/Education:   *Compare initial vs discharge Picture Your Plate Assessment.  *Compare initial vs discharge body composition and waist circumference. Pt was able to lose 1/2 inch at waist, decreased 1lb fat wt and 2lbs lean wt.    Education: Building a Healthy Plate, Dietary Fats, Sodium, BMI & Body Composition, Nutrition: Eating Out, Smart Snacking, Recipe Makeovers, Individual Cholesterol Levels, Managing Cholesterol, Diabetes & Exercise      OTHER CORE COMPONENTS/ RISK FACTORS DISCHARGE/FOLLOW-UP  Medication compliance: Poor compliance  Using pill box: Yes  Carries medication list:  "Yes    Blood Pressure Management:  History of High BP: Yes  Resting BP: 118/70    Tobacco: FORMER  Form of tobacco: Cigarettes; 1 PPD x 20 yrs  Quit Date:  1978  Anyone in the house smoke: No    Initial Knowledge Test Score: 10/15  Discharge Knowledge Test Score: 13/15    OTHER CORE COMPONENTS/ RISK FACTOR PLAN   Other Core components/Risk Factor Goals:                                                                                                                                                    1.  Able to increase knowledge test score by.    2.  Decrease dyspnea on exertion while in the program. Pt denies dyspnea during most recent sessions. Encouraging pt to pace himself. Not necessary to have vigorous exercise.     Other Components/ Risk Factors Intervention/Education:  *Will continue to monitor HR, BP, dyspnea and arrhythmias each session.   *Met with pt 1:1 to review program goals and outcomes.   *Encouraged review of education materials.     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  Patient questioned regarding any new stress, depression and anxiety symptoms:Yes, denies    Family/Support System: Wife, Anglican  Seeing mental health provider: No  Psychosocial medications: None    Initial PHQ-9 score: 10 (Moderate Risk)  [place] PHQ-9 score: 11(Moderate " "Risk)  Discharge PHQ-9 score: 4 (No Risk)  Was PHQ-9 faxed to provider: No  Date faxed: MAYCO    Quality of Life Survey: SF-36 Pre Post   Physical Component Score 55.86 44.85   Mental Component Score 30.72 45.71     Stages of change:  Preparation    PSYCHOSOCIAL PLAN  Psychosocial Goals:  1. Improve stage of change from \"Preparation\" to \"Action\" while in the program. Pt engages in home exercise but does not show an improved diet.   2. Improved PHQ-9 category classification from \"Moderate Risk\" to \"Mild Risk\" while in the program. Pt shows strong emotional health. Has a good support system and encouraging wife.     Psychosocial Interventions/ Education:  *Aim to progress 1 MET every 6 weeks per reported dyspnea and pain  *Incorporate resistance training for muscular endurance and strength.  *Will introduce new equipment as appropriate. Alternating upper and lower body.     Education: Intimacy & Heart Disease, Stress Series, Emotional Series     EXERCISE DISCHARGE/FOLLOW-UP  Home Exercise: Yes  Frequency: 6 days/week for 45 minutes  Mode:  [employer]; 15 minutes on treadmill plus resistance exercises (alternates upper and lower body days) ; does moderate intensity    EXERCISE PLAN  Exercise Goals:   1. Goal of 5.6 METs by discharge. Currently working at peak of 4.6 METs.  2. Have a plan in place for continued exercise after the program by discharge. Currently exercising 3 days a week at [employer]    Exercise Prescription:   Based on 12 Minute Walk Test  Frequency: 3 days per week  Duration (total aerobic min.): 30 minutes  Intensity RPE: 11-14  Target HR: Rest +30; (age-predicted: )  MET Level Range: 2.7-4.6     Modality METS Load  Duration   1 Warm Up    05:00   2 Upright Bike 4.3 68 [place] Level 2 06:00   3 Weights  80  06:00   4 Treadmill 4.6 3 mph 3% 06:00   5 NuStep 2.7 64 [place] Level 5 06:00   6 Recumbent Bike 3.7 52 [place] Level 2 06:00   7 Cool Down     05:00     Exercise Intervention/Education: "   *Aim to progress 1 MET every 6 Weeks or as tolerated.  *Incorporate resistance training for muscular endurance and strength.    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 Alternative views to cardiac health.     FALL RISK  moderate      INDIVIDUAL PATIENT GOALS:   Learn breathing techniques to improve shortness of breath while in the program.  Pt instructed on pacing, improvements noted with decreased complaints of dyspnea during exercise.    2.    Return to regular exercise routine at [employer] by discharge. Currently exercising at [facility] on days off.     MEDICATIONS  Aspirin 81mg daily; Atorvastatin 80mg daily; Cardivedilol 12.5mg BID; Ticagrelor 90mg BID; Entresto 49-51mg BID; Ascorbic Acid 500mg daily; Empagliflozin 25mg daily; Multivitamin 1 tab QID; Probiotic  1 tab daily; CoQ10 100mg daily; Zinc 1 tab daily; Homocysteine support 1 tab daily; Vitamin D3+K2 1 tab daily; Ubiquinol 100mg daily; Focus IQ 1 tab daily; N-Acetyl L-Cysteine 1 tab daily; Whole B complex 1 tab daily; Juice plus vegetable+sindhu blend 1 tab daily; Cyruta plus 1 tab daily; Cardio plus 1 tab daily; Glutathione 100mg daily; Turmeric carcumin 1 tab daily; Ginkgo biloba 120mg daily    STAFF COMMENTS:   Patient has completed full program 36/36 sessions. Pt is very pleasant and has dedication to exercise. Able to increase exercise capacity by 48%. Pt is limited with exercise due to chronic dyspnea and significant chronic CAD. Pt encouraged and instructed on necessity to pace during exercise and eliminate ideas that he needs to engage in vigorous exercise.  Pt has poor compliance with medications and has  alternative views to cardiac health. Continues to engage in home exercise at [email or web address].

## 2024-09-17 ENCOUNTER — LAB (OUTPATIENT)
Dept: LAB | Facility: LAB | Age: 79
End: 2024-09-17
Payer: MEDICARE

## 2024-09-17 DIAGNOSIS — N40.0 BENIGN PROSTATIC HYPERPLASIA WITHOUT LOWER URINARY TRACT SYMPTOMS: ICD-10-CM

## 2024-09-17 DIAGNOSIS — R79.89 OTHER SPECIFIED ABNORMAL FINDINGS OF BLOOD CHEMISTRY: Primary | ICD-10-CM

## 2024-09-17 LAB
ESTRADIOL SERPL-MCNC: 26 PG/ML
PSA SERPL-MCNC: 1.67 NG/ML
TESTOST SERPL-MCNC: 469 NG/DL (ref 240–1000)

## 2024-09-17 PROCEDURE — 36415 COLL VENOUS BLD VENIPUNCTURE: CPT

## 2024-09-17 PROCEDURE — 84153 ASSAY OF PSA TOTAL: CPT

## 2024-09-17 PROCEDURE — 84403 ASSAY OF TOTAL TESTOSTERONE: CPT

## 2024-09-17 PROCEDURE — 82670 ASSAY OF TOTAL ESTRADIOL: CPT

## 2024-10-01 ENCOUNTER — APPOINTMENT (OUTPATIENT)
Dept: CARDIOLOGY | Facility: CLINIC | Age: 79
End: 2024-10-01
Payer: MEDICARE

## 2024-10-17 ENCOUNTER — APPOINTMENT (OUTPATIENT)
Dept: CARDIOLOGY | Facility: CLINIC | Age: 79
End: 2024-10-17
Payer: MEDICARE

## 2024-11-27 ENCOUNTER — LAB (OUTPATIENT)
Dept: LAB | Facility: LAB | Age: 79
End: 2024-11-27
Payer: MEDICARE

## 2024-11-27 DIAGNOSIS — E11.65 TYPE 2 DIABETES MELLITUS WITH HYPERGLYCEMIA (MULTI): ICD-10-CM

## 2024-11-27 DIAGNOSIS — M85.88 OTHER SPECIFIED DISORDERS OF BONE DENSITY AND STRUCTURE, OTHER SITE: Primary | ICD-10-CM

## 2024-11-27 DIAGNOSIS — I10 ESSENTIAL (PRIMARY) HYPERTENSION: ICD-10-CM

## 2024-11-27 DIAGNOSIS — R53.83 OTHER FATIGUE: ICD-10-CM

## 2024-11-27 LAB
25(OH)D3 SERPL-MCNC: 76 NG/ML (ref 30–100)
ALBUMIN SERPL BCP-MCNC: 4.1 G/DL (ref 3.4–5)
ALP SERPL-CCNC: 49 U/L (ref 33–136)
ALT SERPL W P-5'-P-CCNC: 11 U/L (ref 10–52)
ANION GAP SERPL CALC-SCNC: 11 MMOL/L (ref 10–20)
AST SERPL W P-5'-P-CCNC: 12 U/L (ref 9–39)
BILIRUB SERPL-MCNC: 0.6 MG/DL (ref 0–1.2)
BUN SERPL-MCNC: 28 MG/DL (ref 6–23)
CALCIUM SERPL-MCNC: 9 MG/DL (ref 8.6–10.3)
CHLORIDE SERPL-SCNC: 107 MMOL/L (ref 98–107)
CHOLEST SERPL-MCNC: 247 MG/DL (ref 0–199)
CHOLESTEROL/HDL RATIO: 4.8
CO2 SERPL-SCNC: 25 MMOL/L (ref 21–32)
CREAT SERPL-MCNC: 1.27 MG/DL (ref 0.5–1.3)
CREAT UR-MCNC: 90.7 MG/DL (ref 20–370)
EGFRCR SERPLBLD CKD-EPI 2021: 57 ML/MIN/1.73M*2
ERYTHROCYTE [DISTWIDTH] IN BLOOD BY AUTOMATED COUNT: 13.4 % (ref 11.5–14.5)
EST. AVERAGE GLUCOSE BLD GHB EST-MCNC: 160 MG/DL
GLUCOSE SERPL-MCNC: 173 MG/DL (ref 74–99)
HBA1C MFR BLD: 7.2 %
HCT VFR BLD AUTO: 46.5 % (ref 41–52)
HDLC SERPL-MCNC: 51 MG/DL
HGB BLD-MCNC: 15.1 G/DL (ref 13.5–17.5)
LDLC SERPL CALC-MCNC: 171 MG/DL
MCH RBC QN AUTO: 30.5 PG (ref 26–34)
MCHC RBC AUTO-ENTMCNC: 32.5 G/DL (ref 32–36)
MCV RBC AUTO: 94 FL (ref 80–100)
MICROALBUMIN UR-MCNC: 48.5 MG/L
MICROALBUMIN/CREAT UR: 53.5 UG/MG CREAT
NON HDL CHOLESTEROL: 196 MG/DL (ref 0–149)
NRBC BLD-RTO: 0 /100 WBCS (ref 0–0)
PLATELET # BLD AUTO: 243 X10*3/UL (ref 150–450)
POTASSIUM SERPL-SCNC: 4.3 MMOL/L (ref 3.5–5.3)
PROT SERPL-MCNC: 6.9 G/DL (ref 6.4–8.2)
RBC # BLD AUTO: 4.95 X10*6/UL (ref 4.5–5.9)
SODIUM SERPL-SCNC: 139 MMOL/L (ref 136–145)
TRIGL SERPL-MCNC: 126 MG/DL (ref 0–149)
TSH SERPL-ACNC: 3.05 MIU/L (ref 0.44–3.98)
VLDL: 25 MG/DL (ref 0–40)
WBC # BLD AUTO: 5.3 X10*3/UL (ref 4.4–11.3)

## 2024-11-27 PROCEDURE — 36415 COLL VENOUS BLD VENIPUNCTURE: CPT

## 2024-11-27 PROCEDURE — 82306 VITAMIN D 25 HYDROXY: CPT

## 2024-11-27 PROCEDURE — 80061 LIPID PANEL: CPT

## 2024-11-27 PROCEDURE — 83036 HEMOGLOBIN GLYCOSYLATED A1C: CPT

## 2024-11-27 PROCEDURE — 80053 COMPREHEN METABOLIC PANEL: CPT

## 2024-11-27 PROCEDURE — 82570 ASSAY OF URINE CREATININE: CPT

## 2024-11-27 PROCEDURE — 84443 ASSAY THYROID STIM HORMONE: CPT

## 2024-11-27 PROCEDURE — 82043 UR ALBUMIN QUANTITATIVE: CPT

## 2024-11-27 PROCEDURE — 85027 COMPLETE CBC AUTOMATED: CPT

## 2024-12-08 DIAGNOSIS — I65.23 CAROTID STENOSIS, ASYMPTOMATIC, BILATERAL: Primary | ICD-10-CM

## 2025-01-06 ENCOUNTER — HOSPITAL ENCOUNTER (OUTPATIENT)
Facility: HOSPITAL | Age: 80
Setting detail: OBSERVATION
Discharge: HOME | End: 2025-01-07
Attending: STUDENT IN AN ORGANIZED HEALTH CARE EDUCATION/TRAINING PROGRAM | Admitting: INTERNAL MEDICINE
Payer: MEDICARE

## 2025-01-06 ENCOUNTER — APPOINTMENT (OUTPATIENT)
Dept: RADIOLOGY | Facility: HOSPITAL | Age: 80
End: 2025-01-06
Payer: MEDICARE

## 2025-01-06 ENCOUNTER — APPOINTMENT (OUTPATIENT)
Dept: CARDIOLOGY | Facility: HOSPITAL | Age: 80
End: 2025-01-06
Payer: MEDICARE

## 2025-01-06 DIAGNOSIS — G93.40 ENCEPHALOPATHY, UNSPECIFIED TYPE: ICD-10-CM

## 2025-01-06 DIAGNOSIS — G45.9 TRANSIENT ISCHEMIC ATTACK: Primary | ICD-10-CM

## 2025-01-06 DIAGNOSIS — I21.3 ST ELEVATION MYOCARDIAL INFARCTION (STEMI), UNSPECIFIED ARTERY (MULTI): ICD-10-CM

## 2025-01-06 LAB
ALBUMIN SERPL BCP-MCNC: 4.1 G/DL (ref 3.4–5)
ALP SERPL-CCNC: 55 U/L (ref 33–136)
ALT SERPL W P-5'-P-CCNC: 12 U/L (ref 10–52)
ANION GAP SERPL CALC-SCNC: 12 MMOL/L (ref 10–20)
APPEARANCE UR: CLEAR
APTT PPP: 29 SECONDS (ref 27–38)
AST SERPL W P-5'-P-CCNC: 15 U/L (ref 9–39)
BASOPHILS # BLD AUTO: 0.05 X10*3/UL (ref 0–0.1)
BASOPHILS NFR BLD AUTO: 0.7 %
BILIRUB SERPL-MCNC: 0.4 MG/DL (ref 0–1.2)
BILIRUB UR STRIP.AUTO-MCNC: NEGATIVE MG/DL
BUN SERPL-MCNC: 33 MG/DL (ref 6–23)
CALCIUM SERPL-MCNC: 8.6 MG/DL (ref 8.6–10.3)
CARDIAC TROPONIN I PNL SERPL HS: 5 NG/L (ref 0–20)
CHLORIDE SERPL-SCNC: 105 MMOL/L (ref 98–107)
CHOLEST SERPL-MCNC: 204 MG/DL (ref 0–199)
CHOLESTEROL/HDL RATIO: 4.5
CO2 SERPL-SCNC: 21 MMOL/L (ref 21–32)
COLOR UR: YELLOW
CREAT SERPL-MCNC: 1.17 MG/DL (ref 0.5–1.3)
EGFRCR SERPLBLD CKD-EPI 2021: 63 ML/MIN/1.73M*2
EOSINOPHIL # BLD AUTO: 0.26 X10*3/UL (ref 0–0.4)
EOSINOPHIL NFR BLD AUTO: 3.5 %
ERYTHROCYTE [DISTWIDTH] IN BLOOD BY AUTOMATED COUNT: 13.7 % (ref 11.5–14.5)
GLUCOSE BLD MANUAL STRIP-MCNC: 147 MG/DL (ref 74–99)
GLUCOSE BLD MANUAL STRIP-MCNC: 161 MG/DL (ref 74–99)
GLUCOSE SERPL-MCNC: 161 MG/DL (ref 74–99)
GLUCOSE UR STRIP.AUTO-MCNC: ABNORMAL MG/DL
HCT VFR BLD AUTO: 42.7 % (ref 41–52)
HDLC SERPL-MCNC: 45.7 MG/DL
HGB BLD-MCNC: 14.2 G/DL (ref 13.5–17.5)
HOLD SPECIMEN: NORMAL
IMM GRANULOCYTES # BLD AUTO: 0.01 X10*3/UL (ref 0–0.5)
IMM GRANULOCYTES NFR BLD AUTO: 0.1 % (ref 0–0.9)
INR PPP: 1 (ref 0.9–1.1)
KETONES UR STRIP.AUTO-MCNC: NEGATIVE MG/DL
LDLC SERPL CALC-MCNC: 96 MG/DL
LEUKOCYTE ESTERASE UR QL STRIP.AUTO: NEGATIVE
LYMPHOCYTES # BLD AUTO: 1.92 X10*3/UL (ref 0.8–3)
LYMPHOCYTES NFR BLD AUTO: 25.7 %
MCH RBC QN AUTO: 30.6 PG (ref 26–34)
MCHC RBC AUTO-ENTMCNC: 33.3 G/DL (ref 32–36)
MCV RBC AUTO: 92 FL (ref 80–100)
MONOCYTES # BLD AUTO: 0.67 X10*3/UL (ref 0.05–0.8)
MONOCYTES NFR BLD AUTO: 9 %
NEUTROPHILS # BLD AUTO: 4.56 X10*3/UL (ref 1.6–5.5)
NEUTROPHILS NFR BLD AUTO: 61 %
NITRITE UR QL STRIP.AUTO: NEGATIVE
NON HDL CHOLESTEROL: 158 MG/DL (ref 0–149)
NRBC BLD-RTO: 0 /100 WBCS (ref 0–0)
PH UR STRIP.AUTO: 5 [PH]
PLATELET # BLD AUTO: 204 X10*3/UL (ref 150–450)
POCT GLUCOSE: 161 MG/DL (ref 74–99)
POTASSIUM SERPL-SCNC: 4.1 MMOL/L (ref 3.5–5.3)
PROT SERPL-MCNC: 7 G/DL (ref 6.4–8.2)
PROT UR STRIP.AUTO-MCNC: NEGATIVE MG/DL
PROTHROMBIN TIME: 11.2 SECONDS (ref 9.8–12.8)
RBC # BLD AUTO: 4.64 X10*6/UL (ref 4.5–5.9)
RBC # UR STRIP.AUTO: NEGATIVE /UL
SODIUM SERPL-SCNC: 134 MMOL/L (ref 136–145)
SP GR UR STRIP.AUTO: 1.01
TRIGL SERPL-MCNC: 313 MG/DL (ref 0–149)
UROBILINOGEN UR STRIP.AUTO-MCNC: ABNORMAL MG/DL
VLDL: 63 MG/DL (ref 0–40)
WBC # BLD AUTO: 7.5 X10*3/UL (ref 4.4–11.3)

## 2025-01-06 PROCEDURE — 2500000001 HC RX 250 WO HCPCS SELF ADMINISTERED DRUGS (ALT 637 FOR MEDICARE OP)

## 2025-01-06 PROCEDURE — 70496 CT ANGIOGRAPHY HEAD: CPT | Performed by: RADIOLOGY

## 2025-01-06 PROCEDURE — G0378 HOSPITAL OBSERVATION PER HR: HCPCS

## 2025-01-06 PROCEDURE — 82947 ASSAY GLUCOSE BLOOD QUANT: CPT

## 2025-01-06 PROCEDURE — 82947 ASSAY GLUCOSE BLOOD QUANT: CPT | Mod: 59

## 2025-01-06 PROCEDURE — 84484 ASSAY OF TROPONIN QUANT: CPT

## 2025-01-06 PROCEDURE — 70551 MRI BRAIN STEM W/O DYE: CPT

## 2025-01-06 PROCEDURE — 99223 1ST HOSP IP/OBS HIGH 75: CPT | Performed by: STUDENT IN AN ORGANIZED HEALTH CARE EDUCATION/TRAINING PROGRAM

## 2025-01-06 PROCEDURE — 80053 COMPREHEN METABOLIC PANEL: CPT

## 2025-01-06 PROCEDURE — 80061 LIPID PANEL: CPT

## 2025-01-06 PROCEDURE — 36415 COLL VENOUS BLD VENIPUNCTURE: CPT

## 2025-01-06 PROCEDURE — 85610 PROTHROMBIN TIME: CPT

## 2025-01-06 PROCEDURE — 2500000004 HC RX 250 GENERAL PHARMACY W/ HCPCS (ALT 636 FOR OP/ED)

## 2025-01-06 PROCEDURE — 83036 HEMOGLOBIN GLYCOSYLATED A1C: CPT | Mod: STJLAB

## 2025-01-06 PROCEDURE — 70551 MRI BRAIN STEM W/O DYE: CPT | Performed by: RADIOLOGY

## 2025-01-06 PROCEDURE — 99291 CRITICAL CARE FIRST HOUR: CPT | Performed by: STUDENT IN AN ORGANIZED HEALTH CARE EDUCATION/TRAINING PROGRAM

## 2025-01-06 PROCEDURE — 2500000001 HC RX 250 WO HCPCS SELF ADMINISTERED DRUGS (ALT 637 FOR MEDICARE OP): Performed by: STUDENT IN AN ORGANIZED HEALTH CARE EDUCATION/TRAINING PROGRAM

## 2025-01-06 PROCEDURE — 70498 CT ANGIOGRAPHY NECK: CPT | Performed by: RADIOLOGY

## 2025-01-06 PROCEDURE — 70450 CT HEAD/BRAIN W/O DYE: CPT

## 2025-01-06 PROCEDURE — 99285 EMERGENCY DEPT VISIT HI MDM: CPT | Mod: 25 | Performed by: STUDENT IN AN ORGANIZED HEALTH CARE EDUCATION/TRAINING PROGRAM

## 2025-01-06 PROCEDURE — 70498 CT ANGIOGRAPHY NECK: CPT

## 2025-01-06 PROCEDURE — 2550000001 HC RX 255 CONTRASTS: Performed by: STUDENT IN AN ORGANIZED HEALTH CARE EDUCATION/TRAINING PROGRAM

## 2025-01-06 PROCEDURE — 70450 CT HEAD/BRAIN W/O DYE: CPT | Performed by: RADIOLOGY

## 2025-01-06 PROCEDURE — 93005 ELECTROCARDIOGRAM TRACING: CPT

## 2025-01-06 PROCEDURE — 85025 COMPLETE CBC W/AUTO DIFF WBC: CPT

## 2025-01-06 PROCEDURE — 81003 URINALYSIS AUTO W/O SCOPE: CPT

## 2025-01-06 PROCEDURE — 85730 THROMBOPLASTIN TIME PARTIAL: CPT

## 2025-01-06 RX ORDER — SACUBITRIL AND VALSARTAN 49; 51 MG/1; MG/1
1 TABLET, FILM COATED ORAL 2 TIMES DAILY
Status: DISCONTINUED | OUTPATIENT
Start: 2025-01-06 | End: 2025-01-06

## 2025-01-06 RX ORDER — CARVEDILOL 12.5 MG/1
12.5 TABLET ORAL
Status: DISCONTINUED | OUTPATIENT
Start: 2025-01-07 | End: 2025-01-07

## 2025-01-06 RX ORDER — ACETAMINOPHEN 160 MG/5ML
650 SOLUTION ORAL EVERY 4 HOURS PRN
Status: DISCONTINUED | OUTPATIENT
Start: 2025-01-06 | End: 2025-01-07 | Stop reason: HOSPADM

## 2025-01-06 RX ORDER — NAPROXEN SODIUM 220 MG/1
324 TABLET, FILM COATED ORAL ONCE
Status: COMPLETED | OUTPATIENT
Start: 2025-01-06 | End: 2025-01-06

## 2025-01-06 RX ORDER — CARVEDILOL 12.5 MG/1
12.5 TABLET ORAL
Status: DISCONTINUED | OUTPATIENT
Start: 2025-01-06 | End: 2025-01-06

## 2025-01-06 RX ORDER — HYDRALAZINE HYDROCHLORIDE 20 MG/ML
INJECTION INTRAMUSCULAR; INTRAVENOUS
Status: DISCONTINUED
Start: 2025-01-06 | End: 2025-01-06 | Stop reason: WASHOUT

## 2025-01-06 RX ORDER — POLYETHYLENE GLYCOL 3350 17 G/17G
17 POWDER, FOR SOLUTION ORAL DAILY
Status: DISCONTINUED | OUTPATIENT
Start: 2025-01-06 | End: 2025-01-07 | Stop reason: HOSPADM

## 2025-01-06 RX ORDER — ATORVASTATIN CALCIUM 80 MG/1
80 TABLET, FILM COATED ORAL NIGHTLY
Status: DISCONTINUED | OUTPATIENT
Start: 2025-01-06 | End: 2025-01-06

## 2025-01-06 RX ORDER — ACETAMINOPHEN 650 MG/1
650 SUPPOSITORY RECTAL EVERY 4 HOURS PRN
Status: DISCONTINUED | OUTPATIENT
Start: 2025-01-06 | End: 2025-01-07 | Stop reason: HOSPADM

## 2025-01-06 RX ORDER — ENOXAPARIN SODIUM 100 MG/ML
40 INJECTION SUBCUTANEOUS EVERY 24 HOURS
Status: DISCONTINUED | OUTPATIENT
Start: 2025-01-06 | End: 2025-01-07 | Stop reason: HOSPADM

## 2025-01-06 RX ORDER — CLOPIDOGREL BISULFATE 75 MG/1
75 TABLET ORAL DAILY
Status: DISCONTINUED | OUTPATIENT
Start: 2025-01-06 | End: 2025-01-07 | Stop reason: HOSPADM

## 2025-01-06 RX ORDER — SACUBITRIL AND VALSARTAN 49; 51 MG/1; MG/1
1 TABLET, FILM COATED ORAL 2 TIMES DAILY
Status: DISCONTINUED | OUTPATIENT
Start: 2025-01-07 | End: 2025-01-07 | Stop reason: HOSPADM

## 2025-01-06 RX ORDER — CLOPIDOGREL BISULFATE 75 MG/1
75 TABLET ORAL DAILY
Status: ON HOLD | COMMUNITY
End: 2025-01-07

## 2025-01-06 RX ORDER — ACETAMINOPHEN 325 MG/1
650 TABLET ORAL EVERY 4 HOURS PRN
Status: DISCONTINUED | OUTPATIENT
Start: 2025-01-06 | End: 2025-01-07 | Stop reason: HOSPADM

## 2025-01-06 RX ORDER — DEXTROSE 50 % IN WATER (D50W) INTRAVENOUS SYRINGE
25
Status: DISCONTINUED | OUTPATIENT
Start: 2025-01-06 | End: 2025-01-07 | Stop reason: HOSPADM

## 2025-01-06 RX ORDER — NAPROXEN SODIUM 220 MG/1
81 TABLET, FILM COATED ORAL DAILY
Status: DISCONTINUED | OUTPATIENT
Start: 2025-01-06 | End: 2025-01-06

## 2025-01-06 RX ORDER — ATORVASTATIN CALCIUM 80 MG/1
80 TABLET, FILM COATED ORAL NIGHTLY
Status: DISCONTINUED | OUTPATIENT
Start: 2025-01-07 | End: 2025-01-07

## 2025-01-06 RX ORDER — DEXTROSE 50 % IN WATER (D50W) INTRAVENOUS SYRINGE
12.5
Status: DISCONTINUED | OUTPATIENT
Start: 2025-01-06 | End: 2025-01-07 | Stop reason: HOSPADM

## 2025-01-06 RX ORDER — INSULIN LISPRO 100 [IU]/ML
0-5 INJECTION, SOLUTION INTRAVENOUS; SUBCUTANEOUS
Status: DISCONTINUED | OUTPATIENT
Start: 2025-01-06 | End: 2025-01-07 | Stop reason: HOSPADM

## 2025-01-06 RX ADMIN — POLYETHYLENE GLYCOL 3350 17 G: 17 POWDER, FOR SOLUTION ORAL at 17:12

## 2025-01-06 RX ADMIN — ASPIRIN 81 MG CHEWABLE TABLET 324 MG: 81 TABLET CHEWABLE at 15:27

## 2025-01-06 RX ADMIN — CLOPIDOGREL BISULFATE 75 MG: 75 TABLET ORAL at 17:12

## 2025-01-06 RX ADMIN — IOHEXOL 70 ML: 350 INJECTION, SOLUTION INTRAVENOUS at 13:35

## 2025-01-06 SDOH — SOCIAL STABILITY: SOCIAL INSECURITY
WITHIN THE LAST YEAR, HAVE YOU BEEN RAPED OR FORCED TO HAVE ANY KIND OF SEXUAL ACTIVITY BY YOUR PARTNER OR EX-PARTNER?: NO

## 2025-01-06 SDOH — ECONOMIC STABILITY: FOOD INSECURITY: WITHIN THE PAST 12 MONTHS, THE FOOD YOU BOUGHT JUST DIDN'T LAST AND YOU DIDN'T HAVE MONEY TO GET MORE.: NEVER TRUE

## 2025-01-06 SDOH — SOCIAL STABILITY: SOCIAL INSECURITY: WITHIN THE LAST YEAR, HAVE YOU BEEN HUMILIATED OR EMOTIONALLY ABUSED IN OTHER WAYS BY YOUR PARTNER OR EX-PARTNER?: NO

## 2025-01-06 SDOH — ECONOMIC STABILITY: FOOD INSECURITY: WITHIN THE PAST 12 MONTHS, YOU WORRIED THAT YOUR FOOD WOULD RUN OUT BEFORE YOU GOT THE MONEY TO BUY MORE.: NEVER TRUE

## 2025-01-06 SDOH — SOCIAL STABILITY: SOCIAL INSECURITY: HAVE YOU HAD ANY THOUGHTS OF HARMING ANYONE ELSE?: NO

## 2025-01-06 SDOH — SOCIAL STABILITY: SOCIAL INSECURITY: HAS ANYONE EVER THREATENED TO HURT YOUR FAMILY OR YOUR PETS?: NO

## 2025-01-06 SDOH — SOCIAL STABILITY: SOCIAL INSECURITY
WITHIN THE LAST YEAR, HAVE YOU BEEN KICKED, HIT, SLAPPED, OR OTHERWISE PHYSICALLY HURT BY YOUR PARTNER OR EX-PARTNER?: NO

## 2025-01-06 SDOH — SOCIAL STABILITY: SOCIAL INSECURITY: WITHIN THE LAST YEAR, HAVE YOU BEEN AFRAID OF YOUR PARTNER OR EX-PARTNER?: NO

## 2025-01-06 SDOH — ECONOMIC STABILITY: INCOME INSECURITY: IN THE PAST 12 MONTHS HAS THE ELECTRIC, GAS, OIL, OR WATER COMPANY THREATENED TO SHUT OFF SERVICES IN YOUR HOME?: NO

## 2025-01-06 SDOH — SOCIAL STABILITY: SOCIAL INSECURITY: WERE YOU ABLE TO COMPLETE ALL THE BEHAVIORAL HEALTH SCREENINGS?: YES

## 2025-01-06 SDOH — SOCIAL STABILITY: SOCIAL INSECURITY: ARE YOU OR HAVE YOU BEEN THREATENED OR ABUSED PHYSICALLY, EMOTIONALLY, OR SEXUALLY BY ANYONE?: NO

## 2025-01-06 SDOH — SOCIAL STABILITY: SOCIAL INSECURITY: ARE THERE ANY APPARENT SIGNS OF INJURIES/BEHAVIORS THAT COULD BE RELATED TO ABUSE/NEGLECT?: NO

## 2025-01-06 SDOH — SOCIAL STABILITY: SOCIAL INSECURITY: DO YOU FEEL ANYONE HAS EXPLOITED OR TAKEN ADVANTAGE OF YOU FINANCIALLY OR OF YOUR PERSONAL PROPERTY?: NO

## 2025-01-06 SDOH — SOCIAL STABILITY: SOCIAL INSECURITY: HAVE YOU HAD THOUGHTS OF HARMING ANYONE ELSE?: NO

## 2025-01-06 SDOH — SOCIAL STABILITY: SOCIAL INSECURITY: ABUSE: ADULT

## 2025-01-06 SDOH — SOCIAL STABILITY: SOCIAL INSECURITY: DOES ANYONE TRY TO KEEP YOU FROM HAVING/CONTACTING OTHER FRIENDS OR DOING THINGS OUTSIDE YOUR HOME?: NO

## 2025-01-06 SDOH — SOCIAL STABILITY: SOCIAL INSECURITY: DO YOU FEEL UNSAFE GOING BACK TO THE PLACE WHERE YOU ARE LIVING?: NO

## 2025-01-06 ASSESSMENT — LIFESTYLE VARIABLES
AUDIT-C TOTAL SCORE: 1
EVER FELT BAD OR GUILTY ABOUT YOUR DRINKING: NO
TOTAL SCORE: 0
HAVE PEOPLE ANNOYED YOU BY CRITICIZING YOUR DRINKING: NO
AUDIT-C TOTAL SCORE: 1
HAVE YOU EVER FELT YOU SHOULD CUT DOWN ON YOUR DRINKING: NO
HOW OFTEN DO YOU HAVE A DRINK CONTAINING ALCOHOL: MONTHLY OR LESS
EVER HAD A DRINK FIRST THING IN THE MORNING TO STEADY YOUR NERVES TO GET RID OF A HANGOVER: NO
HOW MANY STANDARD DRINKS CONTAINING ALCOHOL DO YOU HAVE ON A TYPICAL DAY: 1 OR 2
SKIP TO QUESTIONS 9-10: 1
HOW OFTEN DO YOU HAVE 6 OR MORE DRINKS ON ONE OCCASION: NEVER

## 2025-01-06 ASSESSMENT — PAIN SCALES - GENERAL
PAINLEVEL_OUTOF10: 0 - NO PAIN
PAINLEVEL_OUTOF10: 2
PAINLEVEL_OUTOF10: 0 - NO PAIN

## 2025-01-06 ASSESSMENT — ACTIVITIES OF DAILY LIVING (ADL)
FEEDING YOURSELF: INDEPENDENT
ADEQUATE_TO_COMPLETE_ADL: YES
GROOMING: INDEPENDENT
WALKS IN HOME: INDEPENDENT
JUDGMENT_ADEQUATE_SAFELY_COMPLETE_DAILY_ACTIVITIES: YES
DRESSING YOURSELF: INDEPENDENT
TOILETING: INDEPENDENT
BATHING: INDEPENDENT
PATIENT'S MEMORY ADEQUATE TO SAFELY COMPLETE DAILY ACTIVITIES?: YES
LACK_OF_TRANSPORTATION: NO
HEARING - RIGHT EAR: FUNCTIONAL
HEARING - LEFT EAR: FUNCTIONAL

## 2025-01-06 ASSESSMENT — COGNITIVE AND FUNCTIONAL STATUS - GENERAL
CLIMB 3 TO 5 STEPS WITH RAILING: A LITTLE
MOBILITY SCORE: 23
DAILY ACTIVITIY SCORE: 24
PATIENT BASELINE BEDBOUND: NO

## 2025-01-06 ASSESSMENT — PAIN DESCRIPTION - DESCRIPTORS: DESCRIPTORS: PRESSURE

## 2025-01-06 ASSESSMENT — PAIN - FUNCTIONAL ASSESSMENT
PAIN_FUNCTIONAL_ASSESSMENT: 0-10

## 2025-01-06 ASSESSMENT — PATIENT HEALTH QUESTIONNAIRE - PHQ9
2. FEELING DOWN, DEPRESSED OR HOPELESS: NOT AT ALL
SUM OF ALL RESPONSES TO PHQ9 QUESTIONS 1 & 2: 0
1. LITTLE INTEREST OR PLEASURE IN DOING THINGS: NOT AT ALL

## 2025-01-06 ASSESSMENT — PAIN DESCRIPTION - LOCATION: LOCATION: CHEST

## 2025-01-06 ASSESSMENT — PAIN DESCRIPTION - PAIN TYPE: TYPE: ACUTE PAIN

## 2025-01-06 NOTE — ED NOTES
1310 Stroke alert one push activation by PATSY Cameron   Emergency overheakatharine Arana, EMT  01/06/25 [id number]

## 2025-01-06 NOTE — H&P
"INTERNAL MEDICINE HISTORY AND PHYSICAL  TEACHING SERVICE - BLUE TEAM    Subjective   Phi Hoffman is a 79 y.o. male with significant cardiac and neurologic PMHx of CAD s/p 4 PCIs (2002, 2011, 2019, 2024 with stents to LCx, LAD, first obtuse marginal branch, mid to distal LAD), HFrEF (EF 35-40% on 5/1/24), STEMI, CVA in 2018 (s/p tPA with residual mild L hemiparesis ), DMII (not insulin dependent), heterozygous factor V Leiden, HLD, and HTN who presented to the ED with AMS, gait instability, and left sided tremor. He started feeling \"off\" on Saturday while driving back from [place]. Patient also noted having trouble with finding his words and was slow to respond. Today at 12 pm he noticed a tremor in his left arm and difficulty writing with his left hand. He stated that these symptoms were similar to prior CVA and heart attack which prompted him to come to the ED. On admission he stated that his symptoms have subsided. He denied any facial drooping, numbness, paresthesia, headache, changes to vision, urinary symptoms, n/v/d, fever, or chills or any respiratory complaints.    Of note functional medicine physician told him that statins are not good for you, so patient has not been on statin therapy for multiple months. He had myalgia side effects to Crestor. He also missed 1x of plavix dose because he ran out of medication while on vacation.     Medications: Empagliflozin (Jardiance), Furosemide (Lasix), Sacubitril-valsartan (Entresto), CoQ10, Carvedilol (Coreg), Clopidogrel (Plavix)  SurgHx: PCI x4 (2002, 2011, 2019, 2024 with stents to LCx, LAD, first obtuse marginal branch, mid to distal LAD)  SocialHx: Former smoker 20 pk/yr history, denies EtOH use or illicit drug use, lives at home with wife, sees functional medicine physician    ED Course:  Vitals: afebrile (97.9F), HR 50, RR 15, /74, SpO2 96% on room air  Labs were significant for:  ->CMP: unremarkable   ->CBC: unremarkable   -> Troponin: " 5  -> Protime-INR unremarkable 11.2, 1.0. PTT normal 29.   -> UA with glucose 1000 (+4)  Imaging:  -> CT brain attack head: no acute intracranial pathology  -> CT brain attack angio head and neck: possible occlusion of R vertebral artery and R PICA, 80% short segment stenosis of proximal right internal carotid artery, severe short segment stenosis of right vertebral artery  -> MR brain wo IV contrast: no acute infarct  Interventions:   ->  mg chewable tablet   Per neurology he was not a candidate for TNK d/t outside of window, not candidate for MT because vertebral artery occlusion was most likely chronic, no associated basilar artery thrombosis    He was admitted for workup of encephalopathy and PT evaluation.     Past Medical History:   Diagnosis Date    Diabetes mellitus (Multi)     MI (myocardial infarction) (Multi)     Stroke (Multi)      Past Surgical History:   Procedure Laterality Date    CARDIAC CATHETERIZATION N/A 5/1/2024    Procedure: Left Heart Cath, No LV;  Surgeon: Maurice Salmon MD;  Location: [facility] Cardiac Cath Lab;  Service: Cardiovascular;  Laterality: N/A;    CARDIAC CATHETERIZATION N/A 5/1/2024    Procedure: PCI;  Surgeon: Maurice Salmon MD;  Location: [facility] Cardiac Cath Lab;  Service: Cardiovascular;  Laterality: N/A;     (Not in a hospital admission)    Patient has no known allergies.  Social History     Tobacco Use    Smoking status: Never    Smokeless tobacco: Never   Vaping Use    Vaping status: Never Used   Substance Use Topics    Alcohol use: Not Currently    Drug use: Never     Family History   Problem Relation Name Age of Onset    Rheumatic fever Father      Diabetes type II Brother       Review of Systems:   ROS: 12 systems reviewed and negative except per HPI above    Objective   Vitals:  Most Recent:  Vitals:    01/06/25 1530   BP: 146/74   Pulse: 50   Resp: 15   Temp:    SpO2: 96%       24hr Min/Max:  Temp  Min: 36.5 °C (97.7 °F)  Max: 36.6 °C (97.9 °F)  Pulse  Min: 50  Max:  73  BP  Min: 142/63  Max: 183/85  Resp  Min: 14  Max: 20  SpO2  Min: 95 %  Max: 98 %    Lab/Radiology/Diagnostic Review:  Results for orders placed or performed during the hospital encounter of 01/06/25 (from the past 24 hours)   POCT GLUCOSE   Result Value Ref Range    POCT Glucose 161 (H) 74 - 99 mg/dL   POCT glucose   Result Value Ref Range    POCT Glucose 161 (A) 74 - 99 mg/dL   Lavender Top   Result Value Ref Range    Extra Tube Hold for add-ons.    CBC and Auto Differential   Result Value Ref Range    WBC 7.5 4.4 - 11.3 x10*3/uL    nRBC 0.0 0.0 - 0.0 /100 WBCs    RBC 4.64 4.50 - 5.90 x10*6/uL    Hemoglobin 14.2 13.5 - 17.5 g/dL    Hematocrit 42.7 41.0 - 52.0 %    MCV 92 80 - 100 fL    MCH 30.6 26.0 - 34.0 pg    MCHC 33.3 32.0 - 36.0 g/dL    RDW 13.7 11.5 - 14.5 %    Platelets 204 150 - 450 x10*3/uL    Neutrophils % 61.0 40.0 - 80.0 %    Immature Granulocytes %, Automated 0.1 0.0 - 0.9 %    Lymphocytes % 25.7 13.0 - 44.0 %    Monocytes % 9.0 2.0 - 10.0 %    Eosinophils % 3.5 0.0 - 6.0 %    Basophils % 0.7 0.0 - 2.0 %    Neutrophils Absolute 4.56 1.60 - 5.50 x10*3/uL    Immature Granulocytes Absolute, Automated 0.01 0.00 - 0.50 x10*3/uL    Lymphocytes Absolute 1.92 0.80 - 3.00 x10*3/uL    Monocytes Absolute 0.67 0.05 - 0.80 x10*3/uL    Eosinophils Absolute 0.26 0.00 - 0.40 x10*3/uL    Basophils Absolute 0.05 0.00 - 0.10 x10*3/uL   Comprehensive metabolic panel   Result Value Ref Range    Glucose 161 (H) 74 - 99 mg/dL    Sodium 134 (L) 136 - 145 mmol/L    Potassium 4.1 3.5 - 5.3 mmol/L    Chloride 105 98 - 107 mmol/L    Bicarbonate 21 21 - 32 mmol/L    Anion Gap 12 10 - 20 mmol/L    Urea Nitrogen 33 (H) 6 - 23 mg/dL    Creatinine 1.17 0.50 - 1.30 mg/dL    eGFR 63 >60 mL/min/1.73m*2    Calcium 8.6 8.6 - 10.3 mg/dL    Albumin 4.1 3.4 - 5.0 g/dL    Alkaline Phosphatase 55 33 - 136 U/L    Total Protein 7.0 6.4 - 8.2 g/dL    AST 15 9 - 39 U/L    Bilirubin, Total 0.4 0.0 - 1.2 mg/dL    ALT 12 10 - 52 U/L   Troponin I,  High Sensitivity   Result Value Ref Range    Troponin I, High Sensitivity 5 0 - 20 ng/L   Protime-INR   Result Value Ref Range    Protime 11.2 9.8 - 12.8 seconds    INR 1.0 0.9 - 1.1   APTT   Result Value Ref Range    aPTT 29 27 - 38 seconds   Urinalysis with Reflex Culture and Microscopic   Result Value Ref Range    Color, Urine Yellow Straw, Yellow    Appearance, Urine Clear Clear    Specific Gravity, Urine 1.015 1.005 - 1.035    pH, Urine 5.0 5.0, 5.5, 6.0, 6.5, 7.0, 7.5, 8.0    Protein, Urine NEGATIVE NEGATIVE, 10 (TRACE) mg/dL    Glucose, Urine 1000 (4+) (A) NEGATIVE mg/dL    Blood, Urine NEGATIVE NEGATIVE    Ketones, Urine NEGATIVE NEGATIVE mg/dL    Bilirubin, Urine NEGATIVE NEGATIVE    Urobilinogen, Urine NORM NORM mg/dL    Nitrite, Urine NEGATIVE NEGATIVE    Leukocyte Esterase, Urine NEGATIVE NEGATIVE     MR brain wo IV contrast    Result Date: 1/6/2025  Interpreted By:  Maurice Conway, STUDY: MR BRAIN WO IV CONTRAST;  1/6/2025 2:30 pm   INDICATION: Signs/Symptoms:left facial droop, evaluate for cerebellar CVA.     COMPARISON: CT head and CTA head today.   ACCESSION NUMBER(S): [id number]   ORDERING CLINICIAN: RICARDO ZARAGOZA   TECHNIQUE: Axial T2, FLAIR, DWI, gradient echo T2 and sagittal and coronal T1 weighted images of brain were acquired.   FINDINGS: Foci of encephalomalacia and gliosis right basal ganglia likely related to old infarction or infarctions. Old lacunar infarctions left-greater-than-right cerebellar hemispheres.   No intracranial hemorrhage. No extra-axial fluid collection. No intracranial mass. No abnormally restricted diffusion to suggest recent infarction. Major vascular flow voids are present. Mild-to-moderate bicerebral volume loss.   Ventricles are midline, normal in size and configuration.   Mild-to-moderate paranasal sinus/ethmoid mucosal inflammatory changes. Right-sided nonspecific mastoid fluid. Orbital contents unremarkable. No aggressive appearing osseous lesions.       No  evidence of acute infarct, intracranial mass effect or midline shift.   Foci of encephalomalacia and gliosis right basal ganglia likely related to old infarction or infarctions.  Old lacunar infarctions left-greater-than-right cerebellar hemispheres.   Mild-to-moderate bicerebral volume loss.   MACRO: None   Signed by: Maurice Conway 1/6/2025 2:51 PM Dictation workstation:   [id number]    CT brain attack angio head and neck W and WO IV contrast    Result Date: 1/6/2025  Interpreted By:  Maurice Conway, STUDY: CT BRAIN ATTACK ANGIO HEAD AND NECK W IV CONTRAST;  1/6/2025 1:40 pm   INDICATION: Signs/Symptoms: Posterior stroke eval.     COMPARISON: CT head today. MRI brain 07/10/2024.   ACCESSION NUMBER(S): [id number]   ORDERING CLINICIAN: GERARD SANTIAGO   TECHNIQUE: 70 ML of Omnipaque 350 was administered intravenously and axial images of the head and neck were acquired.  Coronal, sagittal, and 3-D reconstructions were provided for review.   FINDINGS:   CTA COW: Undulating attenuation of flow related enhancement in the V4 segment right vertebral artery with segments of nonenhancement (possible occlusion) present. Thrombosis is suspected. No definite enhancement of the right posteroinferior cerebellar artery.   No aneurysms.  No vascular malformations. Patent dural venous sinuses and intracranial deep venous structures.   CTA CAROTID: No aortic aneurysm or dissection. No significant stenoses at the origins of the great vessels from the aortic arch. No significant stenoses of the brachycephalic artery or bilateral subclavian arteries.   Mild-to-moderate atherosclerotic involvement left carotid bifurcation. On the right there is mixed predominantly fibrofatty atherosclerotic plaque crossing the bifurcation. In the proximal right internal carotid artery narrowest luminal diameter is 1.0 mm which compares to 5.0 mm more distally or approximately 80% short-segment stenosis.   Severe short-segment stenosis origin of the  right vertebral artery, suspected to be on the basis of atherosclerosis.   NONVASCULAR NECK: No soft tissue mass. No adenopathy. Salivary glands are unremarkable. Thyroid gland unremarkable. Bones intact.         1. Undulating attenuation of flow related enhancement in the V4 segment right vertebral artery with segments of nonenhancement (possible occlusion) present. Thrombosis is suspected. No definite enhancement of the right posteroinferior cerebellar artery. 2. Approximately 80 percent short-segment atherosclerotic stenosis proximal right internal carotid artery. 3. Severe short-segment stenosis origin of the right vertebral artery, suspected to be on the basis of atherosclerosis.   MACRO: None   Signed by: Maurice Conway 1/6/2025 1:55 PM Dictation workstation:   [id number]    CT brain attack head wo IV contrast    Result Date: 1/6/2025  Interpreted By:  Oscar Miranda, STUDY: CT BRAIN ATTACK HEAD WO IV CONTRAST  1/6/2025 1:36 pm   INDICATION: Signs/Symptoms:Stroke Evaluation   COMPARISON: None.   ACCESSION NUMBER(S): [id number]   ORDERING CLINICIAN: GERARD SANTIAGO   TECHNIQUE: Contiguous axial CT images of the brain were obtained without IV contrast.   FINDINGS: The ventricles, cisterns and sulci are prominent, consistent with mild diffuse volume loss. Areas of white matter low attenuation are nonspecific but likely related to chronic microvascular disease. There is intracranial atherosclerosis.   Gray-white differentiation is preserved. No acute intracranial hemorrhage or mass effect. No midline shift. Patent basal cisterns. No extraaxial fluid collections.   The calvaria is intact. Scattered mucosal thickening and mucous retention cysts. Otherwise no paranasal sinus fluid levels identified. The visualized mastoid air cells are clear.       No acute intracranial pathology.     MACRO: Oscar Miranda discussed the significance and urgency of this critical finding by telephone with  GERARD SANTIAGO on 1/6/2025 at  1:40 pm. (**-RCF-**) Findings:  See findings.     Signed by: Oscar Miranda 1/6/2025 1:41 PM Dictation workstation:   [id number]       Physical exam:    Physical Exam  Constitutional:       General: He is not in acute distress.     Appearance: Normal appearance. He is normal weight. He is not ill-appearing.   HENT:      Head: Normocephalic and atraumatic.      Mouth/Throat:      Mouth: Mucous membranes are moist.      Pharynx: Oropharynx is clear.   Eyes:      Extraocular Movements: Extraocular movements intact.   Cardiovascular:      Rate and Rhythm: Normal rate and regular rhythm.      Heart sounds: No murmur heard.  Pulmonary:      Effort: No respiratory distress.      Breath sounds: Normal breath sounds. No wheezing or rales.   Abdominal:      General: Abdomen is flat. Bowel sounds are normal. There is no distension.      Tenderness: There is no abdominal tenderness. There is no guarding.   Musculoskeletal:         General: Normal range of motion.      Right lower leg: No edema.      Left lower leg: No edema.   Skin:     General: Skin is warm and dry.      Findings: No rash.   Neurological:      General: No focal deficit present.      Mental Status: He is alert and oriented to person, place, and time. Mental status is at baseline.      Cranial Nerves: No cranial nerve deficit.      Sensory: No sensory deficit.      Motor: No weakness.      Comments: Strength and sensation in tact in upper and lower extremity. No drift in upper or lower extremities.        Assessment /Plan    Assessment & Plan      Assessment/Plan:  This is a 80yo Male who initially presented to the ED with gait instability, new left arm tremor, and AMS. His PMHx is significant for PMHx of CAD s/p 4 PCIs (2002, 2011, 2019, 2024 with stents to LCx, LAD, first obtuse marginal branch, mid to distal LAD), HFrEF (EF 35-40% on 5/1/24), STEMI, CVA in 2018 (s/p tPA with residual mild L hemiparesis ), DMII (not insulin dependent), heterozygous factor V  Leiden, HLD, and HTN. 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 He has been admitted to the hospital for workup of his encephalopathy.     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 From my assessment I think that symptoms are concerning for TIA, especially in setting of lack of statin therapy, hx of CVA, CAD (4 PCIs), and vascular US carotid 7/22/24 showing > 70% stenosis of right proximal internal carotid artery, HF with EF of 35-40%. Infectious etiology less likely as UA is negative, hemodynamic stability, and lack of leukocytosis. BUN was 33, low concern for uremia, no s/s of respiratory or GI illness  - CT brain attack head: no acute intracranial pathology  - CT brain attack angio head and neck: possible occlusion of R vertebral artery and R PICA, 80% short segment stenosis of proximal right internal carotid artery, severe short segment stenosis of right vertebral artery  -> MR brain wo IV contrast: no acute infarct, old lacunar infarctions L>R  Plan:  -Continue home plavix  -Started on atorvastatin 80 mg  -neurology consulted, appreciate recommendations  -PT/OT evaluation, follow up recs  -q4 neuro checks  -daily labs  -lipid panel/a1c ordered, follow up  -multimodal pain regimen  -monitor on telemetry  -consider vascular consult    #DMII (non insulin dependent)  -on Jardiance  Plan:  -last A1c of 7.2 11/27/24, re ordered, follow up  -ISS #1  -hypoglycemia protocol  -POCT glucose    #HFrEF - not in exacerbation  -last echo 35-40% on 5/1/24  -patient's wife states he does not take lasix although med rec per pharmacy shows he does, dispense report shows 30 day supply 5/8/24  -patient is  not fluid overloaded, normal troponins  -continue to monitor    #HTN  #HLD  - Continue home medications as appropriate    Consultants: Neurology  Diet: Cardiac  mIVF: None  Code Status: Full code  DVT Prophylaxis: Lovenox 40 mg subcutaneous once daily  Disposition: Anticipate 1-2 midnight stay pending clinical improvement, PT/OT eval      To be staffed with attending physician,    Dinesh Lynch, M3  [facility] Medical Student  5:46 PM  01/06/25      This note may have been transcribed using Dragon voice recognition system and there is a possibility of unintentional typing misprints.  Any information found to be copied from previous providers is done in the best interest of the patient to provide accurate, quality, and continuity of care.       I saw this patient with the above medical student and performed my own History and Physical Examination. My Subjective and Objective findings are reflected in the above documentation. The Assessment and Plan reflect my input. My Edits are included in the above documentation.    This assessment and plan has been discussed with attending physician.    Malu Delgadillo,   Family Medicine, PGY-1  This note has been transcribed using Dragon voice recognition system and there is a possibility of unintentional typing misprints.  Any information found to be copied from previous providers is done in the best interest of the patient to provide accurate, quality, and continuity of care.       Patient seen and examined independent of resident.  My input was implemented above and agree with documentation    Maurice Arora, DO  PGY3 IM

## 2025-01-06 NOTE — PROGRESS NOTES
PHARMACY STROKE RESPONSE      Patient Name: Phi Hoffman  MRN: [id number]  [facility]    Patient Weight (kg):   Wt Readings from Last 1 Encounters:   01/06/25 93.6 kg (206 lb 5.6 oz)        An acute Brain Attack has been activated, pharmacy participated in multidisciplinary team bedside response for Phi Hoffman.  Contraindications for fibrinolytic therapy have been reviewed by pharmacy and any issues relating to medication therapy have been discussed directly with the provider(s) caring for this patient.     Pharmacy aided in the bedside response for fibrinolytic therapy. Patient did not receive fibrinolysis.    Orders Placed This Encounter      aspirin chewable tablet 324 mg      hydrALAZINE (Apresoline) injection 20 mg/mL  - Omnicell Override Pull      iohexol (OMNIPaque) 350 mg iodine/mL solution 70 mL      Thank you for allowing me to take part in the care of this patient.     Carmelo Qureshi, PharmD  1/6/2025  2:03 PM         References:    Neurological [place] Stroke Tools   Neurological [place] IV Thrombolysis Checklist

## 2025-01-06 NOTE — CONSULTS
"Inpatient consult to Neurology  Consult performed by: Briseida Hernandez MD  Consult ordered by: Gonzales Burnham DO  Reason for consult: brain attack activation          History Of Present Illness  Phi Hoffman is a 79 y.o. male presenting with a history of stroke with residual mild L hemiparesis who presented to the hospital with altered mental status and worsening left sided weakness.    Yesterday, he started to feel off as if he was \"slow to respond.\" He also had worsening balance. Today, about 3 hours prior to arrival to the hospital, he noticed a mild tremor in his left arm and difficulty with writing using his left hand. This is similar to his previous stroke so made him concern and he called 911. On arrival to the ED, he had a mild facial droop, slowed finger taps on the left with an NIHSS 1. CT head showed otto cute findings. CTA showed occlusion of the R vertebral artery and R PICA. He was not a candidate for tNK as he was oustide of the window. He was not a candidate for MT as the vertebral artery occlusion was likely chronic and there was no associated basilar artery thrombosis. BAT MRI obtained due to R PICA occlusion and patient's ataxic hemiparesis. MRI brain showed no acute infarct. Admitted for workup of encephalopathy and PT evaluation.     He has a history of CAD s/p PCI x4, stroke s/p tPA 2018, FVL def, HLD, HTN   He has a remote history of tobacco use,   Last known well: yesterday afternoon 1/5/2025   Had stroke symptoms resolved at time of presentation: No  Past Medical History  Past Medical History:   Diagnosis Date    Diabetes mellitus (Multi)     MI (myocardial infarction) (Multi)     Stroke (Multi)      Surgical History  Past Surgical History:   Procedure Laterality Date    CARDIAC CATHETERIZATION N/A 5/1/2024    Procedure: Left Heart Cath, No LV;  Surgeon: Maurice Salmon MD;  Location: [facility] Cardiac Cath Lab;  Service: Cardiovascular;  Laterality: N/A;    CARDIAC CATHETERIZATION N/A " "5/1/2024    Procedure: PCI;  Surgeon: Maurice Salmon MD;  Location: [facility] Card[facility];  Service: Cardiovascular;  Laterality: N/A;     Social History  Social History     Tobacco Use    Smoking status: Never    Smokeless tobacco: Never   Vaping Use    Vaping status: Never Used   Substance Use Topics    Alcohol use: Not Currently    Drug use: Never     Allergies  Patient has no known allergies.  Home Medications  (Not in a hospital admission)      Review of Systems  Neurological Exam  Mental Status  Awake, alert and oriented to person, place and time. Speech is normal.    Cranial Nerves  CN II: Visual fields full to confrontation.  CN III, IV, VI: Extraocular movements intact bilaterally.  CN V: Facial sensation is normal.  CN VII: Full and symmetric facial movement.  CN VIII: Hearing is normal.  CN IX, X: Palate elevates symmetrically  CN XI: Shoulder shrug strength is normal.  CN XII: Tongue midline without atrophy or fasciculations.  Flattening of the L NLF, symmetric smile .    Motor   No pronator drift.  Mildly slow finger taps in the LUE  Mild fast frequency low amplitude tremor in the LUE, no myoclonus or clonic movements .    Sensory  Light touch is normal in upper and lower extremities.     Coordination  Right: Finger-to-nose normal.Left: Finger-to-nose normal.    Physical Exam  Eyes:      Extraocular Movements: Extraocular movements intact.   Psychiatric:         Speech: Speech normal.       Last Recorded Vitals  Blood pressure 168/73, pulse 65, temperature 36.6 °C (97.9 °F), temperature source Temporal, resp. rate 18, height 1.727 m (5' 8\"), weight 93.6 kg (206 lb 5.6 oz), SpO2 98%.        Relevant Results      NIH Stroke Scale  1A. Level of Consciousness: Alert, Keenly Responsive  1B. Ask Month and Age: Both Questions Right  1C. Blink Eyes & Squeeze Hands: Performs Both Tasks  2. Best Gaze: Normal  3. Visual: No Visual Loss  4. Facial Palsy: Minor Paralysis  5A. Motor - Left Arm: No Drift  5B. Motor - " Right Arm: No Drift  6A. Motor - Left Leg: No Drift  6B. Motor - Right Leg: No Drift  7. Limb Ataxia: Absent  8. Sensory Loss: Normal  9. Best Language: No Aphasia  10. Dysarthria: Normal  11. Extinction and Inattention: No Abnormality  NIH Stroke Scale: 1           [place] Coma Scale  Best Eye Response: Spontaneous  Best Verbal Response: Oriented  Best Motor Response: Follows commands  Deejay Coma Scale Score: 15                No MRI head results found for the past 14 days  CT brain attack head wo IV contrast    Result Date: 1/6/2025  Interpreted By:  Oscar Miranda, STUDY: CT BRAIN ATTACK HEAD WO IV CONTRAST  1/6/2025 1:36 pm   INDICATION: Signs/Symptoms:Stroke Evaluation   COMPARISON: None.   ACCESSION NUMBER(S): [id number]   ORDERING CLINICIAN: GERARD SANTIAGO   TECHNIQUE: Contiguous axial CT images of the brain were obtained without IV contrast.   FINDINGS: The ventricles, cisterns and sulci are prominent, consistent with mild diffuse volume loss. Areas of white matter low attenuation are nonspecific but likely related to chronic microvascular disease. There is intracranial atherosclerosis.   Gray-white differentiation is preserved. No acute intracranial hemorrhage or mass effect. No midline shift. Patent basal cisterns. No extraaxial fluid collections.   The calvaria is intact. Scattered mucosal thickening and mucous retention cysts. Otherwise no paranasal sinus fluid levels identified. The visualized mastoid air cells are clear.       No acute intracranial pathology.     MACRO: Oscar Miranda discussed the significance and urgency of this critical finding by telephone with  GERARD SANTIAGO on 1/6/2025 at 1:40 pm. (**-RCF-**) Findings:  See findings.     Signed by: Oscar Miranda 1/6/2025 1:41 PM Dictation workstation:   [id number]   No echocardiogram results found for the past 14 days        BNP   Date/Time Value Ref Range Status   04/18/2019 11:37  (H) 0 - 99 pg/mL Final     Comment:     .  <100 pg/mL -  Heart failure unlikely  100-299 pg/mL - Intermediate probability of acute heart  .               failure exacerbation. Correlate with clinical  .               context and patient history.    >=300 pg/mL - Heart Failure likely. Correlate with clinical  .               context and patient history.  BNP testing is performed using different testing   methodology at [facility] than at other   [facility] hospitals. Direct result comparisons should   only be made within the same method.          I have personally reviewed the following imaging results CT brain attack angio head and neck W and WO IV contrast    Result Date: 1/6/2025  Interpreted By:  Maurice Conway, STUDY: CT BRAIN ATTACK ANGIO HEAD AND NECK W IV CONTRAST;  1/6/2025 1:40 pm   INDICATION: Signs/Symptoms: Posterior stroke eval.     COMPARISON: CT head today. MRI brain 07/10/2024.   ACCESSION NUMBER(S): [id number]   ORDERING CLINICIAN: GERARD SANTIAGO   TECHNIQUE: 70 ML of Omnipaque 350 was administered intravenously and axial images of the head and neck were acquired.  Coronal, sagittal, and 3-D reconstructions were provided for review.   FINDINGS:   CTA COW: Undulating attenuation of flow related enhancement in the V4 segment right vertebral artery with segments of nonenhancement (possible occlusion) present. Thrombosis is suspected. No definite enhancement of the right posteroinferior cerebellar artery.   No aneurysms.  No vascular malformations. Patent dural venous sinuses and intracranial deep venous structures.   CTA CAROTID: No aortic aneurysm or dissection. No significant stenoses at the origins of the great vessels from the aortic arch. No significant stenoses of the brachycephalic artery or bilateral subclavian arteries.   Mild-to-moderate atherosclerotic involvement left carotid bifurcation. On the right there is mixed predominantly fibrofatty atherosclerotic plaque crossing the bifurcation. In the proximal right internal carotid artery  narrowest luminal diameter is 1.0 mm which compares to 5.0 mm more distally or approximately 80% short-segment stenosis.   Severe short-segment stenosis origin of the right vertebral artery, suspected to be on the basis of atherosclerosis.   NONVASCULAR NECK: No soft tissue mass. No adenopathy. Salivary glands are unremarkable. Thyroid gland unremarkable. Bones intact.         1. Undulating attenuation of flow related enhancement in the V4 segment right vertebral artery with segments of nonenhancement (possible occlusion) present. Thrombosis is suspected. No definite enhancement of the right posteroinferior cerebellar artery. 2. Approximately 80 percent short-segment atherosclerotic stenosis proximal right internal carotid artery. 3. Severe short-segment stenosis origin of the right vertebral artery, suspected to be on the basis of atherosclerosis.   MACRO: None   Signed by: Maurice Conway 1/6/2025 1:55 PM Dictation workstation:   [id number]    CT brain attack head wo IV contrast    Result Date: 1/6/2025  Interpreted By:  Oscar Miranda, STUDY: CT BRAIN ATTACK HEAD WO IV CONTRAST  1/6/2025 1:36 pm   INDICATION: Signs/Symptoms:Stroke Evaluation   COMPARISON: None.   ACCESSION NUMBER(S): [id number]   ORDERING CLINICIAN: GERARD SANTIAGO   TECHNIQUE: Contiguous axial CT images of the brain were obtained without IV contrast.   FINDINGS: The ventricles, cisterns and sulci are prominent, consistent with mild diffuse volume loss. Areas of white matter low attenuation are nonspecific but likely related to chronic microvascular disease. There is intracranial atherosclerosis.   Gray-white differentiation is preserved. No acute intracranial hemorrhage or mass effect. No midline shift. Patent basal cisterns. No extraaxial fluid collections.   The calvaria is intact. Scattered mucosal thickening and mucous retention cysts. Otherwise no paranasal sinus fluid levels identified. The visualized mastoid air cells are clear.       No  acute intracranial pathology.     MACRO: Oscar Miranda discussed the significance and urgency of this critical finding by telephone with  GERARD SANTIAGO on 1/6/2025 at 1:40 pm. (**-RCF-**) Findings:  See findings.     Signed by: Oscar Miranda 1/6/2025 1:41 PM Dictation workstation:   [id number]  .     Stroke Alert CT/MRI review: Actual date and time 1/6/2025 1:36 PM      IV Thrombolysis IV Thrombolysis Checklist      IV Thrombolysis Given: No; Thrombolysis contraindication reason: Time from Last Known Well (or stroke onset) is >4.5 hours , non disabling deficits with low NIHSS       CTH personally reviewed, chronic right basal ganglia infarct, no hypodensity or hemorrhage  MRI brain personally reviewed, no abnormal diffusion restriction    Assessment/Plan   Assessment & Plan    Metabolic encephalopathy 2/2 elevated BUN. Will defer any further infectious workup to primary team. 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

## 2025-01-07 VITALS
BODY MASS INDEX: 31.78 KG/M2 | WEIGHT: 209.66 LBS | OXYGEN SATURATION: 96 % | HEART RATE: 55 BPM | RESPIRATION RATE: 17 BRPM | SYSTOLIC BLOOD PRESSURE: 162 MMHG | DIASTOLIC BLOOD PRESSURE: 77 MMHG | HEIGHT: 68 IN | TEMPERATURE: 96.8 F

## 2025-01-07 PROBLEM — D68.2: Status: ACTIVE | Noted: 2025-01-07

## 2025-01-07 PROBLEM — G45.9 TIA (TRANSIENT ISCHEMIC ATTACK): Status: RESOLVED | Noted: 2025-01-06 | Resolved: 2025-01-07

## 2025-01-07 LAB
ALBUMIN SERPL BCP-MCNC: 3.9 G/DL (ref 3.4–5)
ANION GAP SERPL CALC-SCNC: 13 MMOL/L (ref 10–20)
ATRIAL RATE: 59 BPM
BUN SERPL-MCNC: 29 MG/DL (ref 6–23)
CALCIUM SERPL-MCNC: 9 MG/DL (ref 8.6–10.3)
CHLORIDE SERPL-SCNC: 107 MMOL/L (ref 98–107)
CO2 SERPL-SCNC: 23 MMOL/L (ref 21–32)
CREAT SERPL-MCNC: 1.18 MG/DL (ref 0.5–1.3)
EGFRCR SERPLBLD CKD-EPI 2021: 62 ML/MIN/1.73M*2
ERYTHROCYTE [DISTWIDTH] IN BLOOD BY AUTOMATED COUNT: 13.7 % (ref 11.5–14.5)
EST. AVERAGE GLUCOSE BLD GHB EST-MCNC: 174 MG/DL
GLUCOSE BLD MANUAL STRIP-MCNC: 167 MG/DL (ref 74–99)
GLUCOSE BLD MANUAL STRIP-MCNC: 228 MG/DL (ref 74–99)
GLUCOSE SERPL-MCNC: 141 MG/DL (ref 74–99)
HBA1C MFR BLD: 7.7 %
HCT VFR BLD AUTO: 45.1 % (ref 41–52)
HGB BLD-MCNC: 14.4 G/DL (ref 13.5–17.5)
HOLD SPECIMEN: NORMAL
MAGNESIUM SERPL-MCNC: 2.29 MG/DL (ref 1.6–2.4)
MCH RBC QN AUTO: 29.4 PG (ref 26–34)
MCHC RBC AUTO-ENTMCNC: 31.9 G/DL (ref 32–36)
MCV RBC AUTO: 92 FL (ref 80–100)
NRBC BLD-RTO: 0 /100 WBCS (ref 0–0)
P AXIS: 47 DEGREES
P OFFSET: 180 MS
P ONSET: 121 MS
PHOSPHATE SERPL-MCNC: 4.1 MG/DL (ref 2.5–4.9)
PLATELET # BLD AUTO: 195 X10*3/UL (ref 150–450)
POTASSIUM SERPL-SCNC: 4.6 MMOL/L (ref 3.5–5.3)
PR INTERVAL: 168 MS
Q ONSET: 205 MS
QRS COUNT: 9 BEATS
QRS DURATION: 100 MS
QT INTERVAL: 464 MS
QTC CALCULATION(BAZETT): 459 MS
QTC FREDERICIA: 461 MS
R AXIS: -78 DEGREES
RBC # BLD AUTO: 4.9 X10*6/UL (ref 4.5–5.9)
SODIUM SERPL-SCNC: 138 MMOL/L (ref 136–145)
T AXIS: 5 DEGREES
T OFFSET: 437 MS
VENTRICULAR RATE: 59 BPM
WBC # BLD AUTO: 5.2 X10*3/UL (ref 4.4–11.3)

## 2025-01-07 PROCEDURE — G0378 HOSPITAL OBSERVATION PER HR: HCPCS

## 2025-01-07 PROCEDURE — 82565 ASSAY OF CREATININE: CPT

## 2025-01-07 PROCEDURE — 85027 COMPLETE CBC AUTOMATED: CPT

## 2025-01-07 PROCEDURE — 2500000001 HC RX 250 WO HCPCS SELF ADMINISTERED DRUGS (ALT 637 FOR MEDICARE OP)

## 2025-01-07 PROCEDURE — 97161 PT EVAL LOW COMPLEX 20 MIN: CPT | Mod: GP

## 2025-01-07 PROCEDURE — 82947 ASSAY GLUCOSE BLOOD QUANT: CPT

## 2025-01-07 PROCEDURE — 36415 COLL VENOUS BLD VENIPUNCTURE: CPT

## 2025-01-07 PROCEDURE — 99236 HOSP IP/OBS SAME DATE HI 85: CPT

## 2025-01-07 PROCEDURE — 83735 ASSAY OF MAGNESIUM: CPT

## 2025-01-07 PROCEDURE — 2500000002 HC RX 250 W HCPCS SELF ADMINISTERED DRUGS (ALT 637 FOR MEDICARE OP, ALT 636 FOR OP/ED)

## 2025-01-07 RX ORDER — CLOPIDOGREL BISULFATE 75 MG/1
75 TABLET ORAL DAILY
Qty: 30 TABLET | Refills: 2 | Status: SHIPPED | OUTPATIENT
Start: 2025-01-07 | End: 2025-04-07

## 2025-01-07 RX ADMIN — CLOPIDOGREL BISULFATE 75 MG: 75 TABLET ORAL at 10:18

## 2025-01-07 RX ADMIN — SACUBITRIL AND VALSARTAN 1 TABLET: 49; 51 TABLET, FILM COATED ORAL at 10:17

## 2025-01-07 RX ADMIN — INSULIN LISPRO 2 UNITS: 100 INJECTION, SOLUTION INTRAVENOUS; SUBCUTANEOUS at 12:06

## 2025-01-07 RX ADMIN — EMPAGLIFLOZIN 25 MG: 25 TABLET, FILM COATED ORAL at 10:18

## 2025-01-07 ASSESSMENT — COGNITIVE AND FUNCTIONAL STATUS - GENERAL: MOBILITY SCORE: 24

## 2025-01-07 ASSESSMENT — ACTIVITIES OF DAILY LIVING (ADL): LACK_OF_TRANSPORTATION: NO

## 2025-01-07 ASSESSMENT — PAIN - FUNCTIONAL ASSESSMENT
PAIN_FUNCTIONAL_ASSESSMENT: 0-10
PAIN_FUNCTIONAL_ASSESSMENT: 0-10

## 2025-01-07 ASSESSMENT — PAIN SCALES - GENERAL
PAINLEVEL_OUTOF10: 0 - NO PAIN
PAINLEVEL_OUTOF10: 0 - NO PAIN

## 2025-01-07 NOTE — HOSPITAL COURSE
Phi presented to [facility] ED after experiencing altered mental status, gait instability, and left sided tremor. This started two days prior to admission during his drive back to [place] from [place]. He had difficulty finding words, tremor in his left arm with difficulty writing his name, and problems with balance. In the ED he was hemodynamically stable with complete spontaneous resolution of his symptoms. He was given 325 mg chewable aspirin tablet. His CMP and CBC were unremarkable and his troponin was 5. His protime-INR was unremarkable 11.2, 1.0 with a normal PTT of 29. His UA showed glucose 1000 (+4). Imaging revealed no acute intracranial pathology but showed possible occlusion of the R vertebral artery and R PICA with stenosis of proximal R internal carotid artery and R vertebral artery, MRI bárbara showed no acute infarct. 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     He was admitted for one midnight stay for workup of potential metabolic encephalopathy and PT evaluation. He had no acute overnight events except for bradycardia in the low-mid 40s which he says is normal for him. He was hemodynamically stable otherwise, pain free, and symptom free and thus stable for discharge home.  Patient was instructed to follow-up with his PCP within 7 days of hospital discharge for follow-up appointment.  No medication changes were made during this admission.  He should consider the addition of a statin medication, but further discussions about this can be deferred to his PCP and cardiologist.

## 2025-01-07 NOTE — DISCHARGE INSTRUCTIONS
You are hospitalized due to concern for TIA.  Workup in the hospital, including CT and MRI of your head and brain, were negative for any acute stroke.  This was likely related to recent stress.    Please follow up with your primary care provider within 7 days for hospital follow-up. Please call to make this appointment.  Please follow-up with cardiology as scheduled.    Please take your medications as prescribed.  No medication changes were made during this hospitalization.    If you have any concerning symptoms, or worsening symptoms please call or return, such as chest pain, shortness of breath, new onset confusion, loss of sensation, or fever >103F.    Thank you for allowing us to participate in your medical care!    -[facility] [place]

## 2025-01-07 NOTE — CONSULTS
Consults  Z79.07 Antiplatelet blood thinner management  E11.9 Type 2 diabetes  I25.1 Multiple heart attacks 10 stents initial stents bare-metal stents the last 6 stents drug-eluting stents most recently found to have circumflex occlusion  and acute LAD occlusion opened with a drug-eluting stent and distal embolization  I42.9 On Jardiance Entresto but off beta-blockers his EF improved greater than 50% on recent echo at the [facility] Dr. 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 Silver after he changed from [facility] practice to Dr. Silver practice due to communication difficulties he says was determined to have asthmatic symptoms from Brilinta was changed to Plavix was determined to have diarrhea from metformin and Coreg both were stopped    Recent echo [facility] improved EF greater than 50 to 55% I will review the echo will keep him on Plavix off metformin and off beta-blockers specifically Coreg    History Of Present Illness:    Phi Hoffman is a 79 y.o. male presenting with being on both Plavix and Brilinta patient of Dr. Juno Silver I am on-call plus covering [place]..  (However I see that he actually sees Maurice Salmon not Dr. Silver as I was told) presented with gait instability left arm tremor altered mental status.  Past medical history of CAD status post 4 PCI's 2002 2011 2019 and 2024 to circumflex LAD obtuse marginal mid to distal LAD HFrEF EF 35 to 40% May 1, 2024 STEMI CVA in 2018 status post tPA with left-sided mild hemiparesis heterozygous factor V hypertension dyslipidemia.  Chronic right vertebral PICA occlusion and metabolic encephalopathy with uremia    CT stat showed no acute intracranial process CTA showed occlusion right vertebral right PICA old 80% short  segmental stenosis right internal carotid and severe short segment stenosis right vertebral artery MRI without contrast showed old lacunar infarcts left greater than right but no acute processes    He is on Jardiance both for HFrEF as well as diabetes statins for dyslipidemia given his cardiac cerebrovascular status and hypertension.  Remains on atorvastatin 80, Entresto 4951 twice daily Jardiance 25 Plavix 75    Last note by Dr. Salmon states STEMI PCI to LAD Brilinta for 12 months aspirin indefinitely high-dose statins Jardiance Entresto Coreg for cardiomyopathy Jardiance metformin for diabetes held on Aldactone his notes state to May 2024.  Was found to have inferior anterior ST changes blood pressure discrepancy between right and left arm CTA done found to have a chronically occluded circumflex acutely occluded LAD and had LAD PCI distal embolization to apical LAD Integrilin during the case.  In 2021 had PCI to dominant circumflex was lost to follow-up for 3 years later the patient went to [facility] with signs of stroke involving the left arm slurred speech right CVA infarct with tPA heterozygous for factor V at that point his echo showed EF 55 to 60%    April 18, 2019 PCI to the LAD resolute Mac 2.75 x 22 and 2.75 x 34 drug-eluting stents proximal mid LAD overlapping PCI from the circumflex to the obtuse marginal and overlapping resolute Mac 2.75 x 12 and 2.5 x 22    He had 2 interventions first in 2002 2011 for stents the dominant circumflex there was discussion of bypass back in 11 of 2002 has a 3 x 13 hepatoid stent and angioplasty to the left PDA with a 2.5 x 23 balloon he had PCI catheterization 9/4/2011 with bare-metal 3 x 30 and 3.5 x 12 bare-metal stents in 2011 his echo showed 40 to 45% ejection fraction    I learned from interviewing the patient that he changed earlier this year to Dr. Silver said he was having difficulty getting through to the office of Dr. Salmon and was having  shortness of breath symptoms.  It was determined that he had asthmatic side effects from Brilinta and Silver changed him to Plavix he was also having diarrhea from metformin and Coreg both were discontinued he had a recent echo done at [facility] which I have access to his eye practice at the same location downstairs in the echo there showed his EF had recovered greater than 50% I will review that echo personally today    Social history retired  3 children 3 stepchildren 6 grandchildren over 7 great-grandchildren 3 brothers none with cardiac disease both parents no reported cardiac disease he says he is on multiple supplements     Last Recorded Vitals:  Vitals:    01/06/25 2000 01/07/25 0000 01/07/25 0357 01/07/25 0759   BP: 114/56 161/74 137/67 152/69   BP Location: Right arm Left arm Left arm Left arm   Patient Position: Lying Lying Lying Lying   Pulse: 79 (!) 46 (!) 49 (!) 45   Resp: 18 18 18 18   Temp: 35.7 °C (96.3 °F) 37 °C (98.6 °F) 35.5 °C (95.9 °F) 37 °C (98.6 °F)   TempSrc: Temporal Temporal Temporal Temporal   SpO2: 97% 98% 97% 97%   Weight:       Height:           Last Labs:  CBC - 1/7/2025:  5:46 AM  5.2 14.4 195    45.1      CMP - 1/7/2025:  5:46 AM  9.0 7.0 15 --- 0.4   4.1 3.9 12 55      PTT - 1/6/2025:  1:46 PM  1.0   11.2 29     Troponin I, High Sensitivity   Date/Time Value Ref Range Status   01/06/2025 01:46 PM 5 0 - 20 ng/L Final   05/02/2024 12:00 AM 3,161 (HH) 0 - 20 ng/L Final     Comment:     Previous result verified on 5/1/2024 2207 on specimen/case [id number] called with component [facility] for procedure Troponin I, High Sensitivity, Initial with value 100 ng/L.   05/01/2024 09:35  (HH) 0 - 20 ng/L Final     BNP   Date/Time Value Ref Range Status   04/18/2019 11:37  (H) 0 - 99 pg/mL Final     Comment:     .  <100 pg/mL - Heart failure unlikely  100-299 pg/mL - Intermediate probability of acute heart  .               failure exacerbation. Correlate  with clinical  .               context and patient history.    >=300 pg/mL - Heart Failure likely. Correlate with clinical  .               context and patient history.  BNP testing is performed using different testing   methodology at [facility] than at other   [facility] hospitals. Direct result comparisons should   only be made within the same method.       Hemoglobin A1C   Date/Time Value Ref Range Status   01/06/2025 01:46 PM 7.7 (H) See comment % Final   11/27/2024 08:08 AM 7.2 (H) See comment % Final     LDL Calculated   Date/Time Value Ref Range Status   01/06/2025 01:46 PM 96 <=99 mg/dL Final     Comment:                                 Near   Borderline      AGE      Desirable  Optimal    High     High     Very High     0-19 Y     0 - 109     ---    110-129   >/= 130     ----    20-24 Y     0 - 119     ---    120-159   >/= 160     ----      >24 Y     0 -  99   100-129  130-159   160-189     >/=190     11/27/2024 08:08  (H) <=99 mg/dL Final     Comment:                                 Near   Borderline      AGE      Desirable  Optimal    High     High     Very High     0-19 Y     0 - 109     ---    110-129   >/= 130     ----    20-24 Y     0 - 119     ---    120-159   >/= 160     ----      >24 Y     0 -  99   100-129  130-159   160-189     >/=190     07/12/2024 08:41  (H) <=99 mg/dL Final     Comment:                                 Near   Borderline      AGE      Desirable  Optimal    High     High     Very High     0-19 Y     0 - 109     ---    110-129   >/= 130     ----    20-24 Y     0 - 119     ---    120-159   >/= 160     ----      >24 Y     0 -  99   100-129  130-159   160-189     >/=190       VLDL   Date/Time Value Ref Range Status   01/06/2025 01:46 PM 63 (H) 0 - 40 mg/dL Final   11/27/2024 08:08 AM 25 0 - 40 mg/dL Final   07/12/2024 08:41 AM 34 0 - 40 mg/dL Final      Last I/O:  I/O last 3 completed shifts:  In: 120 (1.3 mL/kg) [P.O.:120]  Out: - (0 mL/kg)   Weight: 95.1 kg  "    Past Cardiology Tests (Last 3 Years):  EKG:  ECG 12 lead 01/06/2025 (Preliminary)      Electrocardiogram 12 Lead 05/02/2024      ECG 12 lead 05/01/2024    Echo:  Transthoracic Echo (TTE) Complete 05/02/2024    Ejection Fractions:  No results found for: \"EF\"  Cath:  Cardiac Catheterization Procedure 05/01/2024    Stress Test:  No results found for this or any previous visit from the past 1095 days.    Cardiac Imaging:  No results found for this or any previous visit from the past 1095 days.      Past Medical History:  He has a past medical history of Diabetes mellitus (Multi), MI (myocardial infarction) (Multi), and Stroke (Multi).    Past Surgical History:  He has a past surgical history that includes Cardiac catheterization (N/A, 5/1/2024) and Cardiac catheterization (N/A, 5/1/2024).      Social History:  He reports that he has never smoked. He has never used smokeless tobacco. He reports that he does not currently use alcohol. He reports that he does not use drugs.    Family History:  Family History   Problem Relation Name Age of Onset    Rheumatic fever Father      Diabetes type II Brother          Allergies:  Patient has no known allergies.    Inpatient Medications:  Scheduled medications   Medication Dose Route Frequency    clopidogrel  75 mg oral Daily    empagliflozin  25 mg oral Daily    enoxaparin  40 mg subcutaneous q24h    insulin lispro  0-5 Units subcutaneous TID AC    polyethylene glycol  17 g oral Daily    sacubitriL-valsartan  1 tablet oral BID     PRN medications   Medication    acetaminophen    Or    acetaminophen    Or    acetaminophen    dextrose    dextrose    glucagon    glucagon     Continuous Medications   Medication Dose Last Rate     Outpatient Medications:  Current Outpatient Medications   Medication Instructions    ascorbic acid (VITAMIN C) 500 mg, Daily    atorvastatin (LIPITOR) 80 mg, oral, Nightly    carvedilol (COREG) 12.5 mg, oral, 2 times daily (morning and late afternoon)    " clopidogrel (PLAVIX) 75 mg, oral, Daily    empagliflozin (JARDIANCE) 25 mg, oral, Daily    furosemide (LASIX) 40 mg, oral, Daily PRN    lactobacillus acidophilus (Bacid) tablet tablet 1 tablet, Daily    multivitamin with minerals tablet 2 tablets, 2 times daily    sacubitriL-valsartan (Entresto) 49-51 mg tablet 1 tablet, oral, 2 times daily    ticagrelor (Brilinta) 90 mg tablet Take 1 tablet (90 mg) by mouth 2 times a day    ubidecarenone (coenzyme Q10) 100 mg tablet 1 tablet, Daily       Physical Exam:  Subjective:   Examination:   General Examination:   General Appearance: Well developed, well nourished, in no acute distress.   Head: normocephalic, atraumatic   Eyes: Anicteric sclera. Pupils are equally round and reactive to light.  Extraocular movements are intact.    Ears: normal   Oral: Cavity: mucosa moist.   Throat: clear.   Neck/Thyroid: neck supple, full range of motion, no cervical lymphadenopathy.   Skin: warm and dry, no suspicious lesions.    Heart: regular rate and rhythm, S1, S2 normal, no murmurs.   Lungs: clear to auscultation bilaterally.   Abdomen: soft, non-tender, non-distended, bowl sound present, normal.   Extremities: no clubbing, no cyanosis, no edema.   Neuro: non-focal, motor strength normal upper lower extremities, sensory exam intact.       Assessment/Plan     Z79.07 Antiplatelet blood thinner management  E11.9 Type 2 diabetes  I25.1 Multiple heart attacks 10 stents initial stents bare-metal stents the last 6 stents drug-eluting stents most recently found to have circumflex occlusion  and acute LAD occlusion opened with a drug-eluting stent and distal embolization  I42.9 On Jardiance Entresto but off beta-blockers his EF improved greater than 50% on recent echo at the [facility] Dr. 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 Silver after he changed from [facility] practice to Dr. Silver practice due to communication difficulties he says was determined to have asthmatic symptoms from Brilinta was changed to Plavix was determined to have diarrhea from metformin and Coreg both were stopped    Recent echo [facility] improved EF greater than 50 to 55% I will review the echo will keep him on Plavix off metformin and off beta-blockers specifically Coreg    Code Status:  Full Code    I spent 70 minutes in the professional and overall care of this patient.        Parveen Cardenas MD

## 2025-01-07 NOTE — PROGRESS NOTES
"Phi Hoffman is a 79 y.o. male on day 0 of admission presenting with TIA (transient ischemic attack).      Subjective   Pt reports dramatic improvement of symptoms, however, feels his writing ability is still \"slightly abnormal.\"        Objective     Last Recorded Vitals  Blood pressure 162/77, pulse 55, temperature 36 °C (96.8 °F), temperature source Temporal, resp. rate 17, height 1.727 m (5' 8\"), weight 95.1 kg (209 lb 10.5 oz), SpO2 96%.    Physical Exam  Neurological Exam    Mental Status  Awake, alert and oriented to person, place and time. Speech is normal.     Cranial Nerves  CN II: Visual fields full to confrontation.  CN III, IV, VI: Extraocular movements intact bilaterally.  CN V: Facial sensation is normal.  CN VII: Flattening of the L NLF. Smile is symmetrical.   CN VIII: Hearing is normal.  CN IX, X: Palate elevates symmetrically  CN XI: Shoulder shrug strength is normal.  CN XII: Tongue midline without atrophy or fasciculations.      Motor  No pronator drift.  Slower finger taps in the LUE  Left arm resting tremor.      Sensory  Light touch is normal in upper and lower extremities.      Coordination  Right: Finger-to-nose normal.Left: Finger-to-nose normal.     Relevant Results  Scheduled medications  clopidogrel, 75 mg, oral, Daily  empagliflozin, 25 mg, oral, Daily  enoxaparin, 40 mg, subcutaneous, q24h  insulin lispro, 0-5 Units, subcutaneous, TID AC  polyethylene glycol, 17 g, oral, Daily  sacubitriL-valsartan, 1 tablet, oral, BID      Continuous medications     PRN medications  PRN medications: acetaminophen **OR** acetaminophen **OR** acetaminophen, dextrose, dextrose, glucagon, glucagon  Results for orders placed or performed during the hospital encounter of 01/06/25 (from the past 24 hours)   POCT GLUCOSE   Result Value Ref Range    POCT Glucose 161 (H) 74 - 99 mg/dL   POCT glucose   Result Value Ref Range    POCT Glucose 161 (A) 74 - 99 mg/dL   Lavender Top   Result Value Ref Range    " Extra Tube Hold for add-ons.    CBC and Auto Differential   Result Value Ref Range    WBC 7.5 4.4 - 11.3 x10*3/uL    nRBC 0.0 0.0 - 0.0 /100 WBCs    RBC 4.64 4.50 - 5.90 x10*6/uL    Hemoglobin 14.2 13.5 - 17.5 g/dL    Hematocrit 42.7 41.0 - 52.0 %    MCV 92 80 - 100 fL    MCH 30.6 26.0 - 34.0 pg    MCHC 33.3 32.0 - 36.0 g/dL    RDW 13.7 11.5 - 14.5 %    Platelets 204 150 - 450 x10*3/uL    Neutrophils % 61.0 40.0 - 80.0 %    Immature Granulocytes %, Automated 0.1 0.0 - 0.9 %    Lymphocytes % 25.7 13.0 - 44.0 %    Monocytes % 9.0 2.0 - 10.0 %    Eosinophils % 3.5 0.0 - 6.0 %    Basophils % 0.7 0.0 - 2.0 %    Neutrophils Absolute 4.56 1.60 - 5.50 x10*3/uL    Immature Granulocytes Absolute, Automated 0.01 0.00 - 0.50 x10*3/uL    Lymphocytes Absolute 1.92 0.80 - 3.00 x10*3/uL    Monocytes Absolute 0.67 0.05 - 0.80 x10*3/uL    Eosinophils Absolute 0.26 0.00 - 0.40 x10*3/uL    Basophils Absolute 0.05 0.00 - 0.10 x10*3/uL   Comprehensive metabolic panel   Result Value Ref Range    Glucose 161 (H) 74 - 99 mg/dL    Sodium 134 (L) 136 - 145 mmol/L    Potassium 4.1 3.5 - 5.3 mmol/L    Chloride 105 98 - 107 mmol/L    Bicarbonate 21 21 - 32 mmol/L    Anion Gap 12 10 - 20 mmol/L    Urea Nitrogen 33 (H) 6 - 23 mg/dL    Creatinine 1.17 0.50 - 1.30 mg/dL    eGFR 63 >60 mL/min/1.73m*2    Calcium 8.6 8.6 - 10.3 mg/dL    Albumin 4.1 3.4 - 5.0 g/dL    Alkaline Phosphatase 55 33 - 136 U/L    Total Protein 7.0 6.4 - 8.2 g/dL    AST 15 9 - 39 U/L    Bilirubin, Total 0.4 0.0 - 1.2 mg/dL    ALT 12 10 - 52 U/L   Troponin I, High Sensitivity   Result Value Ref Range    Troponin I, High Sensitivity 5 0 - 20 ng/L   Protime-INR   Result Value Ref Range    Protime 11.2 9.8 - 12.8 seconds    INR 1.0 0.9 - 1.1   APTT   Result Value Ref Range    aPTT 29 27 - 38 seconds   Hemoglobin A1c   Result Value Ref Range    Hemoglobin A1C 7.7 (H) See comment %    Estimated Average Glucose 174 Not Established mg/dL   Lipid panel   Result Value Ref Range     Cholesterol 204 (H) 0 - 199 mg/dL    HDL-Cholesterol 45.7 mg/dL    Cholesterol/HDL Ratio 4.5     LDL Calculated 96 <=99 mg/dL    VLDL 63 (H) 0 - 40 mg/dL    Triglycerides 313 (H) 0 - 149 mg/dL    Non HDL Cholesterol 158 (H) 0 - 149 mg/dL   ECG 12 lead   Result Value Ref Range    Ventricular Rate 59 BPM    Atrial Rate 59 BPM    NH Interval 168 ms    QRS Duration 100 ms    QT Interval 464 ms    QTC Calculation(Bazett) 459 ms    P Axis 47 degrees    R Axis -78 degrees    T Axis 5 degrees    QRS Count 9 beats    Q Onset 205 ms    P Onset 121 ms    P Offset 180 ms    T Offset 437 ms    QTC Fredericia 461 ms   Urinalysis with Reflex Culture and Microscopic   Result Value Ref Range    Color, Urine Yellow Straw, Yellow    Appearance, Urine Clear Clear    Specific Gravity, Urine 1.015 1.005 - 1.035    pH, Urine 5.0 5.0, 5.5, 6.0, 6.5, 7.0, 7.5, 8.0    Protein, Urine NEGATIVE NEGATIVE, 10 (TRACE) mg/dL    Glucose, Urine 1000 (4+) (A) NEGATIVE mg/dL    Blood, Urine NEGATIVE NEGATIVE    Ketones, Urine NEGATIVE NEGATIVE mg/dL    Bilirubin, Urine NEGATIVE NEGATIVE    Urobilinogen, Urine NORM NORM mg/dL    Nitrite, Urine NEGATIVE NEGATIVE    Leukocyte Esterase, Urine NEGATIVE NEGATIVE   Extra Urine Gray Tube   Result Value Ref Range    Extra Tube Hold for add-ons.    POCT GLUCOSE   Result Value Ref Range    POCT Glucose 147 (H) 74 - 99 mg/dL   CBC   Result Value Ref Range    WBC 5.2 4.4 - 11.3 x10*3/uL    nRBC 0.0 0.0 - 0.0 /100 WBCs    RBC 4.90 4.50 - 5.90 x10*6/uL    Hemoglobin 14.4 13.5 - 17.5 g/dL    Hematocrit 45.1 41.0 - 52.0 %    MCV 92 80 - 100 fL    MCH 29.4 26.0 - 34.0 pg    MCHC 31.9 (L) 32.0 - 36.0 g/dL    RDW 13.7 11.5 - 14.5 %    Platelets 195 150 - 450 x10*3/uL   Renal Function Panel   Result Value Ref Range    Glucose 141 (H) 74 - 99 mg/dL    Sodium 138 136 - 145 mmol/L    Potassium 4.6 3.5 - 5.3 mmol/L    Chloride 107 98 - 107 mmol/L    Bicarbonate 23 21 - 32 mmol/L    Anion Gap 13 10 - 20 mmol/L    Urea Nitrogen  29 (H) 6 - 23 mg/dL    Creatinine 1.18 0.50 - 1.30 mg/dL    eGFR 62 >60 mL/min/1.73m*2    Calcium 9.0 8.6 - 10.3 mg/dL    Phosphorus 4.1 2.5 - 4.9 mg/dL    Albumin 3.9 3.4 - 5.0 g/dL   Magnesium   Result Value Ref Range    Magnesium 2.29 1.60 - 2.40 mg/dL   POCT GLUCOSE   Result Value Ref Range    POCT Glucose 167 (H) 74 - 99 mg/dL   POCT GLUCOSE   Result Value Ref Range    POCT Glucose 228 (H) 74 - 99 mg/dL     ECG 12 lead    Result Date: 1/7/2025  Sinus bradycardia Left axis deviation Low voltage QRS Lateral infarct (cited on or before 04-SEP-2011) Inferior-posterior infarct (cited on or before 04-SEP-2011) Abnormal ECG When compared with ECG of 02-MAY-2024 00:07, Questionable change in initial forces of Anterior leads    MR brain wo IV contrast    Result Date: 1/6/2025  Interpreted By:  Maurice Conway, STUDY: MR BRAIN WO IV CONTRAST;  1/6/2025 2:30 pm   INDICATION: Signs/Symptoms:left facial droop, evaluate for cerebellar CVA.     COMPARISON: CT head and CTA head today.   ACCESSION NUMBER(S): [id number]   ORDERING CLINICIAN: RICARDO ZARAGOZA   TECHNIQUE: Axial T2, FLAIR, DWI, gradient echo T2 and sagittal and coronal T1 weighted images of brain were acquired.   FINDINGS: Foci of encephalomalacia and gliosis right basal ganglia likely related to old infarction or infarctions. Old lacunar infarctions left-greater-than-right cerebellar hemispheres.   No intracranial hemorrhage. No extra-axial fluid collection. No intracranial mass. No abnormally restricted diffusion to suggest recent infarction. Major vascular flow voids are present. Mild-to-moderate bicerebral volume loss.   Ventricles are midline, normal in size and configuration.   Mild-to-moderate paranasal sinus/ethmoid mucosal inflammatory changes. Right-sided nonspecific mastoid fluid. Orbital contents unremarkable. No aggressive appearing osseous lesions.       No evidence of acute infarct, intracranial mass effect or midline shift.   Foci of encephalomalacia  and gliosis right basal ganglia likely related to old infarction or infarctions.  Old lacunar infarctions left-greater-than-right cerebellar hemispheres.   Mild-to-moderate bicerebral volume loss.   MACRO: None   Signed by: Maurice Conway 1/6/2025 2:51 PM Dictation workstation:   [id number]    CT brain attack angio head and neck W and WO IV contrast    Result Date: 1/6/2025  Interpreted By:  Maurice Conway, STUDY: CT BRAIN ATTACK ANGIO HEAD AND NECK W IV CONTRAST;  1/6/2025 1:40 pm   INDICATION: Signs/Symptoms: Posterior stroke eval.     COMPARISON: CT head today. MRI brain 07/10/2024.   ACCESSION NUMBER(S): [id number]   ORDERING CLINICIAN: GERARD SANTIAGO   TECHNIQUE: 70 ML of Omnipaque 350 was administered intravenously and axial images of the head and neck were acquired.  Coronal, sagittal, and 3-D reconstructions were provided for review.   FINDINGS:   CTA COW: Undulating attenuation of flow related enhancement in the V4 segment right vertebral artery with segments of nonenhancement (possible occlusion) present. Thrombosis is suspected. No definite enhancement of the right posteroinferior cerebellar artery.   No aneurysms.  No vascular malformations. Patent dural venous sinuses and intracranial deep venous structures.   CTA CAROTID: No aortic aneurysm or dissection. No significant stenoses at the origins of the great vessels from the aortic arch. No significant stenoses of the brachycephalic artery or bilateral subclavian arteries.   Mild-to-moderate atherosclerotic involvement left carotid bifurcation. On the right there is mixed predominantly fibrofatty atherosclerotic plaque crossing the bifurcation. In the proximal right internal carotid artery narrowest luminal diameter is 1.0 mm which compares to 5.0 mm more distally or approximately 80% short-segment stenosis.   Severe short-segment stenosis origin of the right vertebral artery, suspected to be on the basis of atherosclerosis.   NONVASCULAR NECK: No  soft tissue mass. No adenopathy. Salivary glands are unremarkable. Thyroid gland unremarkable. Bones intact.         1. Undulating attenuation of flow related enhancement in the V4 segment right vertebral artery with segments of nonenhancement (possible occlusion) present. Thrombosis is suspected. No definite enhancement of the right posteroinferior cerebellar artery. 2. Approximately 80 percent short-segment atherosclerotic stenosis proximal right internal carotid artery. 3. Severe short-segment stenosis origin of the right vertebral artery, suspected to be on the basis of atherosclerosis.   MACRO: None   Signed by: Maurice Conway 1/6/2025 1:55 PM Dictation workstation:   [id number]    CT brain attack head wo IV contrast    Result Date: 1/6/2025  Interpreted By:  Oscar Miranda, STUDY: CT BRAIN ATTACK HEAD WO IV CONTRAST  1/6/2025 1:36 pm   INDICATION: Signs/Symptoms:Stroke Evaluation   COMPARISON: None.   ACCESSION NUMBER(S): [id number]   ORDERING CLINICIAN: GERARD SANTIAGO   TECHNIQUE: Contiguous axial CT images of the brain were obtained without IV contrast.   FINDINGS: The ventricles, cisterns and sulci are prominent, consistent with mild diffuse volume loss. Areas of white matter low attenuation are nonspecific but likely related to chronic microvascular disease. There is intracranial atherosclerosis.   Gray-white differentiation is preserved. No acute intracranial hemorrhage or mass effect. No midline shift. Patent basal cisterns. No extraaxial fluid collections.   The calvaria is intact. Scattered mucosal thickening and mucous retention cysts. Otherwise no paranasal sinus fluid levels identified. The visualized mastoid air cells are clear.       No acute intracranial pathology.     MACRO: Oscar Miranda discussed the significance and urgency of this critical finding by telephone with  GERARD SANTIAGO on 1/6/2025 at 1:40 pm. (**-RCF-**) Findings:  See findings.     Signed by: Oscar Miranda 1/6/2025 1:41 PM  Dictation workstation:   [id number]     Assessment/Plan      Assessment & Plan  TIA (transient ischemic attack)    Factor V deficiency, congenital (Multi)    Recrudescence of prior stroke symptoms; suspect 2/2 elevated BUN. MRI showed no evidence of acute stroke. Symptoms have improved.     Recommend:    Increase oral fluid intake.  Continue Plavix, statin  PT evaluation.   Follow up with PCP after discharge.    Case/plan discussed with DR. Hernandez.  No further needs from neurology; okay for transfer or discharge as per primary team. Please contact if condition changes for re-eval.      Cathy Lawrence, APRN-CNP

## 2025-01-07 NOTE — CARE PLAN
The patient's goals for the shift include sleep, no  new complications    The clinical goals for the shift include pt will have no new neuro complications this shift      Problem: Safety - Adult  Goal: Free from fall injury  Outcome: Progressing     Problem: Discharge Planning  Goal: Discharge to home or other facility with appropriate resources  Outcome: Progressing     Problem: Chronic Conditions and Co-morbidities  Goal: Patient's chronic conditions and co-morbidity symptoms are monitored and maintained or improved  Outcome: Progressing     Problem: Diabetes  Goal: Achieve decreasing blood glucose levels by end of shift  Outcome: Progressing  Goal: Increase stability of blood glucose readings by end of shift  Outcome: Progressing  Goal: Decrease in ketones present in urine by end of shift  Outcome: Progressing  Goal: Maintain electrolyte levels within acceptable range throughout shift  Outcome: Progressing  Goal: Maintain glucose levels >70mg/dl to <250mg/dl throughout shift  Outcome: Progressing  Goal: No changes in neurological exam by end of shift  Outcome: Progressing  Goal: Learn about and adhere to nutrition recommendations by end of shift  Outcome: Progressing  Goal: Vital signs within normal range for age by end of shift  Outcome: Progressing  Goal: Increase self care and/or family involovement by end of shift  Outcome: Progressing  Goal: Receive DSME education by end of shift  Outcome: Progressing

## 2025-01-07 NOTE — PROGRESS NOTES
Occupational Therapy                 Therapy Communication Note    Patient Name: Phi Hoffman  MRN: [id number]  Department:   Room: 3110/3110-A  Today's Date: 1/7/2025     Discipline: Occupational Therapy    Comment: Received orders for OT eval 1/6. Completed chart review, and OT screen this date. Pt has been up independent, and does not have any home going OT needs/concerns. Pt with plans to be discharged home this date. Will discharge OT services at this time.

## 2025-01-07 NOTE — PROGRESS NOTES
"Physical Therapy    Physical Therapy Evaluation    Patient Name: Phi Hoffman  MRN: [id number]  Today's Date: 1/7/2025   Time Calculation  Start Time: 0954  Stop Time: 1006  Time Calculation (min): 12 min  3110/3110-A    Assessment/Plan   PT Assessment: Pt appears at baseline level of function.  Pt able to ambulate around room IND.  Pt denies any concerns with returning home upon DC from the hospital.  Will DC PT orders at this time.    Evaluation/Treatment Tolerance: Patient tolerated treatment well  Medical Staff Made Aware: Yes  End of Session Communication: Bedside nurse  End of Session Patient Position: Up in chair, Alarm off, not on at start of session  IP OR SWING BED PT PLAN  Inpatient or Swing Bed: Inpatient  PT Plan  PT Plan: PT Eval only  PT Eval Only Reason: Safe to return home  PT Frequency: PT eval only  PT Discharge Recommendations: No further acute PT, No PT needed after discharge (can persue OP PT if needed)  PT Recommended Transfer Status: Independent  PT - OK to Discharge: Yes - To next level of care when cleared by medical team    Subjective     Current Problem:  1. Transient ischemic attack  clopidogrel (Plavix) 75 mg tablet      2. Encephalopathy, unspecified type        3. ST elevation myocardial infarction (STEMI), unspecified artery (Multi)  clopidogrel (Plavix) 75 mg tablet          Past Medical History:  Patient Active Problem List   Diagnosis    STEMI (ST elevation myocardial infarction) (Multi)    Benign essential HTN    Dyslipidemia    CVA (cerebral vascular accident) (Multi)    CAD S/P percutaneous coronary angioplasty    Acute on chronic systolic heart failure    Diabetes mellitus (Multi)    Factor V Leiden mutation (Multi)    Hypercholesterolemia    Hypertension    Diarrhea    Sinus bradycardia    Factor V deficiency, congenital (Multi)       General Visit Information:  Per EMR: pt presented to the ED with AMS, gait instability, and left sided tremor. He started feeling \"off\" on " Saturday while driving back from [place]. Patient also noted having trouble with finding his words and was slow to respond. Today at 12 pm he noticed a tremor in his left arm and difficulty writing with his left hand. He stated that these symptoms were similar to prior CVA and heart attack which prompted him to come to the ED. On admission he stated that his symptoms have subsided. He denied any facial drooping, numbness, paresthesia, headache, changes to vision, urinary symptoms, n/v/d, fever, or chills or any respiratory complaints.     Of note functional medicine physician told him that statins are not good for you, so patient has not been on statin therapy for multiple months. He had myalgia side effects to Crestor. He also missed 1x of plavix dose because he ran out of medication while on vacation.     On arrival, pt sitting EOB.  Pt in no apparent distress and agreeable to therapy.    General  Reason for Referral: impaired mobility, gait training  Referred By: Tatiana Velasco  Prior to Session Communication: Bedside nurse  Patient Position Received: Alarm off, not on at start of session (sitting EOB)    Home Living/PLOF:  Pt lives in tri level home with wife with 2 ERICA with rail.  7 steps up with rail to bedroom and full bath.  Has tub shower with Gbs.  7 steps down with rail to additional full bath with WIS with Gbs.  IND with mobility and ADLs.  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     Precautions:  Precautions  Medical Precautions: No known precautions/limitation     Objective     Pain:  Pain Assessment  Pain Assessment: 0-10  0-10 (Numeric) Pain Score: 0 - No pain    Cognition:  Cognition  Overall Cognitive Status: Within Functional Limits  Orientation Level: Oriented X4    General Assessments:      Activity Tolerance  Endurance: Endurance does not limit participation in activity  Sensation  Sensation Comment: pt denies any numbness/tingling    Static Sitting Balance  Static  Sitting-Comment/Number of Minutes: good  Dynamic Sitting Balance  Dynamic Sitting-Comments: good  Static Standing Balance  Static Standing-Comment/Number of Minutes: good  Dynamic Standing Balance  Dynamic Standing-Comments: good    Extremity/Trunk Assessments:  BLE strength: grossly WFL    Functional Mobility:  Bed mobility  NT 2/2 sitting EOB on arrival    Transfers  Sit to stand: IND  Stand to sit: IND    Ambulation/Stairs  Pt ambulated 20 ft with IND; grossly steady without LOB    Outcome Measures:  [facility] Basic Mobility  Turning from your back to your side while in a flat bed without using bedrails: None  Moving from lying on your back to sitting on the side of a flat bed without using bedrails: None  Moving to and from bed to chair (including a wheelchair): None  Standing up from a chair using your arms (e.g. wheelchair or bedside chair): None  To walk in hospital room: None  Climbing 3-5 steps with railing: None  Basic Mobility - Total Score: 24    Education Documentation  Mobility Training, taught by Kasey Davidson, PT at 1/7/2025  1:59 PM.  Learner: Patient  Readiness: Acceptance  Method: Explanation  Response: Verbalizes Understanding    Education Comments  No comments found.

## 2025-01-07 NOTE — CARE PLAN
The patient's goals for the shift include sleep, no  new complications    The clinical goals for the shift include pt will have no new neuro complications this shift    Over the shift, the patient did  make progress toward the following goals. Pt sleeping most of shift w/o incident.  Easily arousable for neuro checks- cont w/ left side facial drop but no new complications.  Safety measures maintained.

## 2025-01-07 NOTE — PROGRESS NOTES
01/07/25 1113   Discharge Planning   Living Arrangements Spouse/significant other   Support Systems Spouse/significant other   Assistance Needed none   Type of Residence Private residence   Number of Stairs to Enter Residence 2   Number of Stairs Within Residence 12  (6 up and 6 down)   Home or Post Acute Services None   Expected Discharge Disposition Home   Financial Resource Strain   How hard is it for you to pay for the very basics like food, housing, medical care, and heating? Not hard   Housing Stability   In the last 12 months, was there a time when you were not able to pay the mortgage or rent on time? N   At any time in the past 12 months, were you homeless or living in a shelter (including now)? N   Transportation Needs   In the past 12 months, has lack of transportation kept you from medical appointments or from getting medications? no   In the past 12 months, has lack of transportation kept you from meetings, work, or from getting things needed for daily living? No   Intensity of Service   Intensity of Service 0-30 min     Spoke to patient at bedside to explain my role in discharge planning. Patient states he lives at home with his wife. Patient does not use any DME. Patient states he feels he effectively manages his health at home and plans to return home when medically ready.

## 2025-01-07 NOTE — DISCHARGE SUMMARY
Discharge Diagnosis  TIA (transient ischemic attack)    Issues Requiring Follow-Up  TIA - likely symptoms related to acute stress in patient's life, and could also be related to a few missed doses of Plavix, patient no longer on statin therapy    Discharge Meds     Medication List      CONTINUE taking these medications     ascorbic acid 500 mg tablet; Commonly known as: Vitamin C   Bacid 1 billion cell- 250 mg tablet tablet; Generic drug: lactobacillus   acidophilus   clopidogrel 75 mg tablet; Commonly known as: Plavix; Take 1 tablet (75   mg) by mouth once daily.   coenzyme Q10 100 mg tablet   empagliflozin 25 mg; Commonly known as: Jardiance; Take 1 tablet (25 mg)   by mouth once daily.   Entresto 49-51 mg tablet; Generic drug: sacubitriL-valsartan; Take 1   tablet by mouth 2 times a day.   multivitamin with minerals tablet     STOP taking these medications     atorvastatin 80 mg tablet; Commonly known as: Lipitor   carvedilol 12.5 mg tablet; Commonly known as: Coreg   furosemide 40 mg tablet; Commonly known as: Lasix   ticagrelor 90 mg tablet; Commonly known as: Brilinta       Test Results Pending At Discharge  Pending Labs       No current pending labs.            Hospital Course  Phi presented to [facility] ED after experiencing altered mental status, gait instability, and left sided tremor. This started two days prior to admission during his drive back to [place] from [place]. He had difficulty finding words, tremor in his left arm with difficulty writing his name, and problems with balance. In the ED he was hemodynamically stable with complete spontaneous resolution of his symptoms. He was given 325 mg chewable aspirin tablet. His CMP and CBC were unremarkable and his troponin was 5. His protime-INR was unremarkable 11.2, 1.0 with a normal PTT of 29. His UA showed glucose 1000 (+4). 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     He was admitted for one midnight stay for workup of potential metabolic encephalopathy and PT evaluation. He had no acute overnight events except for bradycardia in the low-mid 40s which he says is normal for him. He was hemodynamically stable otherwise, pain free, and symptom free and thus stable for discharge home.  Patient was instructed to follow-up with his PCP within 7 days of hospital discharge for follow-up appointment.  No medication changes were made during this admission.  He should consider the addition of a statin medication, but further discussions about this can be deferred to his PCP and cardiologist.      Pertinent Physical Exam At Time of Discharge  Physical Exam  General: Not in acute distress, A&O x3, alert, coopertive, well-developed  HEENT: Normocephalic, atraumatic, EOMI, moist mucous membranes  Neck: Neck supple, trachea midline, no evidence of trauma  Cardiovascular: RRR, S1 and S2 appreciated, no murmurs rubs gallops appreciated  Respiratory: Clear to auscultation bilaterally without wheezes, rales, rhonchi; no increased work of breathing noted  GI: Abdomen soft, nondistended, nontender to palpation, bowel sounds present  Extremities: No edema appreciated in lower extremities bilaterally, no cyanosis  Neuro: A&O x3, no focal deficits, strength and sensation intact bilaterally; complete resolution of presenting symptoms on exam today  Skin: Warm and dry, without lesions or rashes      Outpatient Follow-Up  No future appointments.      Julianna Mckeon DO  Internal Medicine PGY-1 Resident

## 2025-01-10 ENCOUNTER — PATIENT OUTREACH (OUTPATIENT)
Dept: CARE COORDINATION | Facility: CLINIC | Age: 80
End: 2025-01-10
Payer: MEDICARE

## 2025-01-10 SDOH — ECONOMIC STABILITY: GENERAL: WOULD YOU LIKE HELP WITH ANY OF THE FOLLOWING NEEDS?: I DONT NEED HELP WITH ANY OF THESE

## 2025-01-10 SDOH — ECONOMIC STABILITY: FOOD INSECURITY
ARE ANY OF YOUR NEEDS URGENT? FOR EXAMPLE, UNCERTAINTY OF WHERE YOU WILL GET YOUR NEXT MEAL OR NOT HAVING THE MEDICATIONS YOU NEED TO TAKE TOMORROW.: NO

## 2025-01-10 NOTE — SIGNIFICANT EVENT
01/10/25 1227   Social Determinants of Health- Help Requested   Would you like help with any of the following needs? I dont need help with any of these   Are any of your needs urgent? For example, uncertainty of where you will get your next meal or not having the medications you need to take tomorrow. N

## 2025-01-10 NOTE — PROGRESS NOTES
Inpatient Facility:  [facility]  Admission: 1/6/2025  Discharge: 1/7/2025  Discharge Diagnosis: TIA    Outreach completed.  This CM spoke with patient wife who states patient is doing well not quite at usual baseline, no c/o dizziness or balance issues.  Per wife patient has returned back to work.  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   Wife states she will make follow up appointment for patient with PCP.  No new medications or changes upon discharge.  No questions or concerns.  CM will continue to follow.     Engagement  Call Start Time: 1220 (1/10/2025 12:22 PM)    Medications  Medications reviewed with patient/caregiver?: Yes (1/10/2025 12:22 PM)  Is the patient having any side effects they believe may be caused by any medication additions or changes?: No (1/10/2025 12:22 PM)  Does the patient have all medications ordered at discharge?: Yes (1/10/2025 12:22 PM)  Care Management Interventions: No intervention needed (1/10/2025 12:22 PM)    Appointments  Does the patient have a primary care provider?: Yes (1/10/2025 12:22 PM)  Care Management Interventions: Advised patient to make appointment (1/10/2025 12:22 PM)  Has the patient kept scheduled appointments due by today?: Yes (1/10/2025 12:22 PM)    Self Management  What is the home health agency?: No (1/10/2025 12:22 PM)  What Durable Medical Equipment (DME) was ordered?: None (1/10/2025 12:22 PM)    Patient Teaching  Does the patient have access to their discharge instructions?: Yes (1/10/2025 12:22 PM)  Care Management Interventions: Reviewed instructions with patient (1/10/2025 12:22 PM)  What is the patient's perception of their health status since discharge?: Improving (1/10/2025 12:22 PM)  Is the patient/caregiver able to teach back the hierarchy of who to call/visit for symptoms/problems? PCP, Specialist, Home Health nurse, Urgent Care, ED, 911: Yes (1/10/2025 12:22 PM)    Wrap  Up  Wrap Up Additional Comments: [facility] will continue to follow up. (1/10/2025 12:22 PM)  Call End Time: 1225 (1/10/2025 12:22 PM)      KRISTEN Forte, RN   [phone]   [facility] Department

## 2025-01-31 LAB
ATRIAL RATE: 59 BPM
P AXIS: 47 DEGREES
P OFFSET: 180 MS
P ONSET: 121 MS
PR INTERVAL: 168 MS
Q ONSET: 205 MS
QRS COUNT: 9 BEATS
QRS DURATION: 100 MS
QT INTERVAL: 464 MS
QTC CALCULATION(BAZETT): 459 MS
QTC FREDERICIA: 461 MS
R AXIS: -78 DEGREES
T AXIS: 5 DEGREES
T OFFSET: 437 MS
VENTRICULAR RATE: 59 BPM

## 2025-02-11 ENCOUNTER — PATIENT OUTREACH (OUTPATIENT)
Dept: CARE COORDINATION | Facility: CLINIC | Age: 80
End: 2025-02-11
Payer: MEDICARE

## 2025-02-11 NOTE — PROGRESS NOTES
Outreach call to patient and wife to check in 30 days after hospital discharge to support smooth transition of care.  Patient with no additional needs noted. No additional outreach needed at this time. Will dis enroll patient from [facility] program.     FELIPE ForteN, RN   [phone]   [facility] Department

## (undated) DEVICE — CATHETER, BALLOON, NC EUPHORA NONCOMPLIANT 2.5 X 15 X 142CM

## (undated) DEVICE — INFLATION DEVICE, BASIXCOMPAX, 30 ATM/BAR, 20ML, MAP152

## (undated) DEVICE — INTRODUCER SHEATH, GLIDESHEATH, 6FR 10CM

## (undated) DEVICE — MBRACE, WRIST SUPPORT WITH HOOK

## (undated) DEVICE — CATHETER, GUIDING, VISTA BRITE 6FR, XB 3.5 100CM

## (undated) DEVICE — Device

## (undated) DEVICE — CATHETER, BALLOON DILATION, EUPHORA SEMICOMPLIANT 2.50  X 12 MM X 142CM

## (undated) DEVICE — GUIDEWIRE, RUN THROUGH WIRE, 180CM

## (undated) DEVICE — TR BAND, RADIAL COMPRESSION, STANDARD, 24CM

## (undated) DEVICE — NEEDLE, MERIT ADVANCE, ONE WALL, 21 GA X 4CM, STANDARD SMOOTH

## (undated) DEVICE — MANIFOLD KIT, CUSTOM (SJM)

## (undated) DEVICE — CATHETER, DIAGNOSTIC, JUDKINS, RIGHT, 5 FR-JR 4.0